# Patient Record
Sex: FEMALE | Race: WHITE | NOT HISPANIC OR LATINO | Employment: UNEMPLOYED | URBAN - METROPOLITAN AREA
[De-identification: names, ages, dates, MRNs, and addresses within clinical notes are randomized per-mention and may not be internally consistent; named-entity substitution may affect disease eponyms.]

---

## 2023-10-04 ENCOUNTER — TELEPHONE (OUTPATIENT)
Dept: PRIMARY CARE | Facility: CLINIC | Age: 58
End: 2023-10-04
Payer: COMMERCIAL

## 2023-10-04 NOTE — TELEPHONE ENCOUNTER
Pt would like a nurse to call her about what the symptoms of a blood clot in a leg are.   [phone]

## 2023-10-12 PROCEDURE — 88175 CYTOPATH C/V AUTO FLUID REDO: CPT

## 2023-10-12 PROCEDURE — 87624 HPV HI-RISK TYP POOLED RSLT: CPT

## 2023-10-14 PROBLEM — M51.36 DDD (DEGENERATIVE DISC DISEASE), LUMBAR: Status: ACTIVE | Noted: 2019-01-03

## 2023-10-14 PROBLEM — R10.9 CHRONIC ABDOMINAL PAIN: Status: ACTIVE | Noted: 2020-01-21

## 2023-10-14 PROBLEM — H69.91 DISORDER OF RIGHT EUSTACHIAN TUBE: Status: ACTIVE | Noted: 2018-08-09

## 2023-10-14 PROBLEM — E78.2 MIXED DYSLIPIDEMIA: Status: ACTIVE | Noted: 2018-11-12

## 2023-10-14 PROBLEM — M19.031 ARTHRITIS OF WRIST, RIGHT: Status: ACTIVE | Noted: 2023-10-14

## 2023-10-14 PROBLEM — R82.90 ABNORMAL FINDING IN URINE: Status: ACTIVE | Noted: 2019-01-03

## 2023-10-14 PROBLEM — E78.2 MIXED HYPERLIPIDEMIA: Status: ACTIVE | Noted: 2018-11-26

## 2023-10-14 PROBLEM — M54.32 SCIATICA, LEFT SIDE: Status: ACTIVE | Noted: 2021-05-20

## 2023-10-14 PROBLEM — J44.89 ASTHMA WITH COPD (MULTI): Status: ACTIVE | Noted: 2019-05-29

## 2023-10-14 PROBLEM — R30.0 DYSURIA: Status: ACTIVE | Noted: 2019-01-24

## 2023-10-14 PROBLEM — T81.49XA SUPERFICIAL POSTOPERATIVE WOUND INFECTION: Status: ACTIVE | Noted: 2023-10-14

## 2023-10-14 PROBLEM — K57.32 DIVERTICULITIS OF LARGE INTESTINE WITHOUT BLEEDING: Status: ACTIVE | Noted: 2023-10-14

## 2023-10-14 PROBLEM — R73.01 IMPAIRED FASTING GLUCOSE: Status: ACTIVE | Noted: 2018-11-12

## 2023-10-14 PROBLEM — M72.2 PLANTAR FASCIITIS OF LEFT FOOT: Status: ACTIVE | Noted: 2022-09-13

## 2023-10-14 PROBLEM — M19.90 INFLAMMATORY ARTHRITIS: Status: ACTIVE | Noted: 2023-10-14

## 2023-10-14 PROBLEM — M19.90 OSTEOARTHRITIS: Status: ACTIVE | Noted: 2019-02-05

## 2023-10-14 PROBLEM — M79.10 MYALGIA: Status: ACTIVE | Noted: 2022-08-09

## 2023-10-14 PROBLEM — L65.9 HAIR LOSS: Status: ACTIVE | Noted: 2018-08-20

## 2023-10-14 PROBLEM — I10 HYPERTENSION: Status: ACTIVE | Noted: 2018-12-05

## 2023-10-14 PROBLEM — L01.00 IMPETIGO: Status: ACTIVE | Noted: 2023-10-14

## 2023-10-14 PROBLEM — G56.01 RIGHT CARPAL TUNNEL SYNDROME: Status: ACTIVE | Noted: 2023-10-14

## 2023-10-14 PROBLEM — M17.11 ARTHRITIS OF KNEE, RIGHT: Status: ACTIVE | Noted: 2023-10-14

## 2023-10-14 PROBLEM — G89.29 CHRONIC ABDOMINAL PAIN: Status: ACTIVE | Noted: 2020-01-21

## 2023-10-14 PROBLEM — E03.9 HYPOTHYROIDISM: Status: ACTIVE | Noted: 2018-08-20

## 2023-10-14 PROBLEM — H00.019 HORDEOLUM EXTERNUM (STYE): Status: ACTIVE | Noted: 2019-01-24

## 2023-10-14 PROBLEM — L91.8 SKIN TAG: Status: ACTIVE | Noted: 2019-03-20

## 2023-10-14 PROBLEM — M77.42 METATARSALGIA OF LEFT FOOT: Status: ACTIVE | Noted: 2018-09-06

## 2023-10-14 PROBLEM — M51.369 DDD (DEGENERATIVE DISC DISEASE), LUMBAR: Status: ACTIVE | Noted: 2019-01-03

## 2023-10-14 PROBLEM — Z86.718 HISTORY OF DEEP VENOUS THROMBOSIS: Status: ACTIVE | Noted: 2023-10-14

## 2023-10-14 PROBLEM — R07.89 CHEST WALL PAIN: Status: ACTIVE | Noted: 2019-08-20

## 2023-10-14 PROBLEM — K57.30 DIVERTICULOSIS OF COLON: Status: ACTIVE | Noted: 2023-10-14

## 2023-10-14 PROBLEM — M76.60 ACHILLES TENDONITIS: Status: ACTIVE | Noted: 2020-12-29

## 2023-10-14 RX ORDER — GLUC/MSM/COLGN2/HYAL/ANTIARTH3 375-375-20
1 TABLET ORAL EVERY 24 HOURS
COMMUNITY

## 2023-10-14 RX ORDER — POLYETHYLENE GLYCOL 3350 17 G/17G
POWDER, FOR SOLUTION ORAL DAILY
COMMUNITY
End: 2024-03-02 | Stop reason: WASHOUT

## 2023-10-14 RX ORDER — ALUMINUM ZIRCONIUM OCTACHLOROHYDREX GLY 16 G/100G
GEL TOPICAL
COMMUNITY
Start: 2018-08-09

## 2023-10-14 RX ORDER — TRAMADOL HYDROCHLORIDE 50 MG/1
50 TABLET ORAL EVERY 8 HOURS PRN
COMMUNITY
Start: 2022-11-14

## 2023-10-14 RX ORDER — MOMETASONE FUROATE AND FORMOTEROL FUMARATE DIHYDRATE 200; 5 UG/1; UG/1
2 AEROSOL RESPIRATORY (INHALATION)
COMMUNITY
Start: 2023-01-18 | End: 2024-03-02 | Stop reason: WASHOUT

## 2023-10-14 RX ORDER — ATORVASTATIN CALCIUM 80 MG/1
80 TABLET, FILM COATED ORAL DAILY
COMMUNITY

## 2023-10-14 RX ORDER — METOPROLOL SUCCINATE 50 MG/1
50 TABLET, EXTENDED RELEASE ORAL DAILY
COMMUNITY

## 2023-10-14 RX ORDER — DOCUSATE SODIUM 100 MG/1
100 CAPSULE ORAL 2 TIMES DAILY PRN
COMMUNITY
Start: 2021-10-11 | End: 2024-03-02 | Stop reason: WASHOUT

## 2023-10-14 RX ORDER — MUPIROCIN 20 MG/G
OINTMENT TOPICAL
COMMUNITY
Start: 2021-05-26 | End: 2024-03-02 | Stop reason: WASHOUT

## 2023-10-14 RX ORDER — MELOXICAM 15 MG/1
15 TABLET ORAL DAILY
COMMUNITY
Start: 2023-05-16 | End: 2024-03-02 | Stop reason: WASHOUT

## 2023-10-14 RX ORDER — LEVOTHYROXINE SODIUM 112 UG/1
112 TABLET ORAL DAILY
COMMUNITY
End: 2023-10-16

## 2023-10-14 RX ORDER — HYDROCODONE BITARTRATE AND ACETAMINOPHEN 5; 325 MG/1; MG/1
TABLET ORAL
COMMUNITY
Start: 2022-02-08 | End: 2024-03-02 | Stop reason: WASHOUT

## 2023-10-14 RX ORDER — CHLORHEXIDINE GLUCONATE ORAL RINSE 1.2 MG/ML
SOLUTION DENTAL
COMMUNITY
Start: 2023-09-12 | End: 2024-03-02 | Stop reason: WASHOUT

## 2023-10-14 RX ORDER — FLUTICASONE FUROATE AND VILANTEROL 200; 25 UG/1; UG/1
POWDER RESPIRATORY (INHALATION)
COMMUNITY
Start: 2021-05-13 | End: 2024-03-02 | Stop reason: WASHOUT

## 2023-10-14 RX ORDER — PANTOPRAZOLE SODIUM 40 MG/1
40 TABLET, DELAYED RELEASE ORAL DAILY
COMMUNITY

## 2023-10-14 RX ORDER — BENZOCAINE AND LEVOMENTHOL 200; 5 MG/G; MG/G
SPRAY TOPICAL
COMMUNITY
End: 2024-03-02 | Stop reason: WASHOUT

## 2023-10-14 RX ORDER — CONJUGATED ESTROGENS 0.62 MG/G
0.5 CREAM VAGINAL 2 TIMES WEEKLY
COMMUNITY
Start: 2023-03-23 | End: 2024-03-02 | Stop reason: WASHOUT

## 2023-10-14 RX ORDER — HYOSCYAMINE SULFATE 0.125 MG
1 TABLET ORAL 4 TIMES DAILY
COMMUNITY
Start: 2021-03-30 | End: 2024-03-02 | Stop reason: WASHOUT

## 2023-10-14 RX ORDER — ALBUTEROL SULFATE 90 UG/1
2 AEROSOL, METERED RESPIRATORY (INHALATION) EVERY 6 HOURS
COMMUNITY
Start: 2022-04-20 | End: 2024-03-11 | Stop reason: SDUPTHER

## 2023-10-14 RX ORDER — SUCRALFATE 1 G/1
1 TABLET ORAL DAILY
COMMUNITY

## 2023-10-14 RX ORDER — VALACYCLOVIR HYDROCHLORIDE 1 G/1
TABLET, FILM COATED ORAL
COMMUNITY
Start: 2021-04-19 | End: 2024-03-02 | Stop reason: WASHOUT

## 2023-10-14 RX ORDER — PREDNISONE 20 MG/1
TABLET ORAL
COMMUNITY
Start: 2021-05-20 | End: 2024-03-02 | Stop reason: WASHOUT

## 2023-10-14 RX ORDER — PSYLLIUM HUSK 0.4 G
2000 CAPSULE ORAL
COMMUNITY
Start: 2018-08-09

## 2023-10-14 RX ORDER — FLUTICASONE PROPIONATE 50 MCG
2 SPRAY, SUSPENSION (ML) NASAL DAILY
COMMUNITY
Start: 2022-11-10 | End: 2024-03-02 | Stop reason: WASHOUT

## 2023-10-14 RX ORDER — ACETAMINOPHEN 500 MG
500 TABLET ORAL EVERY 4 HOURS
COMMUNITY
End: 2024-03-02 | Stop reason: WASHOUT

## 2023-10-14 RX ORDER — DICYCLOMINE HYDROCHLORIDE 20 MG/1
20 TABLET ORAL 3 TIMES DAILY
COMMUNITY
Start: 2023-06-12 | End: 2023-07-12

## 2023-10-14 RX ORDER — LISINOPRIL 2.5 MG/1
2.5 TABLET ORAL DAILY
COMMUNITY
Start: 2018-08-09 | End: 2024-03-02 | Stop reason: WASHOUT

## 2023-10-16 ENCOUNTER — LAB REQUISITION (OUTPATIENT)
Dept: LAB | Facility: HOSPITAL | Age: 58
End: 2023-10-16
Payer: COMMERCIAL

## 2023-10-16 DIAGNOSIS — Z01.419 ENCOUNTER FOR GYNECOLOGICAL EXAMINATION (GENERAL) (ROUTINE) WITHOUT ABNORMAL FINDINGS: ICD-10-CM

## 2023-10-16 DIAGNOSIS — Z12.4 ENCOUNTER FOR SCREENING FOR MALIGNANT NEOPLASM OF CERVIX: ICD-10-CM

## 2023-10-16 DIAGNOSIS — Z11.51 ENCOUNTER FOR SCREENING FOR HUMAN PAPILLOMAVIRUS (HPV): ICD-10-CM

## 2023-10-17 ENCOUNTER — OFFICE VISIT (OUTPATIENT)
Dept: ORTHOPEDIC SURGERY | Facility: CLINIC | Age: 58
End: 2023-10-17
Payer: COMMERCIAL

## 2023-10-17 DIAGNOSIS — M19.031 OSTEOARTHRITIS OF RIGHT WRIST, UNSPECIFIED OSTEOARTHRITIS TYPE: Primary | ICD-10-CM

## 2023-10-17 PROCEDURE — 99213 OFFICE O/P EST LOW 20 MIN: CPT | Performed by: ORTHOPAEDIC SURGERY

## 2023-10-17 PROCEDURE — 3008F BODY MASS INDEX DOCD: CPT | Performed by: ORTHOPAEDIC SURGERY

## 2023-10-17 PROCEDURE — 20606 DRAIN/INJ JOINT/BURSA W/US: CPT | Performed by: PHYSICIAN ASSISTANT

## 2023-10-17 RX ORDER — METHYLPREDNISOLONE ACETATE 40 MG/ML
40 INJECTION, SUSPENSION INTRA-ARTICULAR; INTRALESIONAL; INTRAMUSCULAR; SOFT TISSUE ONCE
Status: SHIPPED | OUTPATIENT
Start: 2023-10-17

## 2023-10-17 ASSESSMENT — ENCOUNTER SYMPTOMS
TROUBLE SWALLOWING: 0
SHORTNESS OF BREATH: 0
WHEEZING: 0
EYE DISCHARGE: 0
SINUS PRESSURE: 0
JOINT SWELLING: 1

## 2023-10-17 ASSESSMENT — PAIN SCALES - GENERAL: PAINLEVEL_OUTOF10: 5 - MODERATE PAIN

## 2023-10-17 ASSESSMENT — PAIN - FUNCTIONAL ASSESSMENT: PAIN_FUNCTIONAL_ASSESSMENT: 0-10

## 2023-10-17 NOTE — PROGRESS NOTES
Reason for Appointment  R wrist pain    History of Present Illness  Patient is a 58 y.o. female here today for follow-up evaluation of right wrist pain.  She was last seen back in August 2022, she has had a previous carpal tunnel release done many years ago, she did have a carpal tunnel injection last year that did not give her much relief.  He did have a right wrist joint injection, she is unsure of how much this helped her.  Mostly pain and aching deep in the wrist joint, she does have some very mild numbness at the tip of the right thumb.  She did have a repeat nerve study test that only showed mild right carpal tunnel syndrome.  She does have a wrist brace at night which makes her pain worse.  No other changes in her past medical history, allergies, or medications.    History reviewed. No pertinent past medical history.    Past Surgical History:   Procedure Laterality Date    US GUIDED THYROID BIOPSY  4/20/2015    US GUIDED THYROID BIOPSY [facility]       Medication Documentation Review Audit       Reviewed by Mica Mitchell MA (Medical Assistant) on 10/17/23 at 1154      Medication Order Taking? Sig Documenting Provider Last Dose Status   acetaminophen (Tylenol) 500 mg tablet [id number] Yes Take 1 tablet (500 mg) by mouth every 4 hours. Historical Provider, MD Taking Active   albuterol (ProAir HFA) 90 mcg/actuation inhaler [id number] Yes Inhale 2 puffs every 6 hours. Historical Provider, MD Taking Active   atorvastatin (Lipitor) 80 mg tablet [id number] Yes Take 1 tablet (80 mg) by mouth once daily. Historical Provider, MD Taking Active   benzocaine-menthoL (Dermoplast, with menthol,) 20-0.5 % topical spray [id number] Yes Dermoplast 20-0.5 % External Aerosol   Refills: 0       Active Historical Provider, MD Taking Active   Bifidobacterium infantis (Align) 4 mg capsule [id number] Yes as directed Orally Historical Provider, MD Taking Active   calcium carbonate-vitamin D3 (Calcium 600 + D,3,) 600 mg-5 mcg  (200 unit) capsule [id number] Yes Take 1 tablet by mouth once every 24 hours. With a meal Michael Ferreira MD Taking Active   chlorhexidine (Peridex) 0.12 % solution [id number] Yes SWISH & SPIT 15 ML 3X A DAY X10 DAYS RINSE 30 SECONDS & SPIT, NO EATTING DRINKING 30 MINS AFTER USE Michael Ferreira MD Taking Active   Colace 100 mg capsule [id number] Yes Take 1 capsule (100 mg) by mouth 2 times a day as needed. Michael Ferreira MD Taking Active   Dulera 200-5 mcg/actuation inhaler [id number] Yes Inhale 2 puffs 2 times a day.  RINSE MOUTH AND THROAT AFTER USE Michael Ferreira MD Taking Active   fluticasone (Flonase) 50 mcg/actuation nasal spray [id number] Yes Administer 2 sprays into affected nostril(s) once daily. Michael Ferreira MD Taking Active   fluticasone furoate-vilanteroL (Breo Ellipta) 200-25 mcg/dose inhaler [id number] Yes Inhale. Michael Ferreira MD Taking Active   HYDROcodone-acetaminophen (Norco) 5-325 mg tablet [id number] Yes Take by mouth. Michael Ferreira MD Taking Active   hyoscyamine (Anaspaz, Levsin) 0.125 mg tablet [id number] Yes Take 1 tablet (0.125 mg) by mouth 4 times a day. Michael Ferreira MD Taking Active     Discontinued 10/16/23 1227   levothyroxine (Synthroid, Levoxyl) 112 mcg tablet [id number] Yes TAKE 1 TABLET BY MOUTH EVERY DAY KENRICK King-CNP Taking Active   lisinopril 2.5 mg tablet [id number] Yes Take 1 tablet (2.5 mg) by mouth once daily. Michael Ferreira MD Taking Active   meloxicam (Mobic) 15 mg tablet [id number] Yes Take 1 tablet (15 mg) by mouth once daily. Michael Ferreira MD Taking Active   metoprolol succinate XL (Toprol-XL) 50 mg 24 hr tablet [id number] Yes Take 1 tablet (50 mg) by mouth once daily. Michael Ferreira MD Taking Active   Miralax 17 gram/dose powder [id number] Yes Take by mouth once daily. Michael Ferreira MD Taking Active   MULTIVITAMIN ORAL [id number] Yes Take 1 tablet by mouth once daily. Michael Ferreira MD  Taking Active   mupirocin (Bactroban) 2 % ointment [id number] Yes Mupirocin 2 % External Ointment   Quantity: 22  Refills: 0        Start : 26-May-2021   Active Historical Provider, MD Taking Active   pantoprazole (ProtoNix) 40 mg EC tablet [id number] Yes Take 1 tablet (40 mg) by mouth once daily. Historical Provider, MD Taking Active   predniSONE (Deltasone) 20 mg tablet [id number] Yes Take by mouth. Historical Provider, MD Taking Active   Premarin vaginal cream [id number] Yes Insert 0.5 g into the vagina 2 times a week. Historical Provider, MD Taking Active   psyllium (MetamuciL) 0.4 gram capsule [id number] Yes Take 2,000 mg by mouth. Historical Provider, MD Taking Active   sucralfate (Carafate) 1 gram tablet [id number] Yes Take 1 tablet (1 g) by mouth once daily. Historical Provider, MD Taking Active   traMADol (Ultram) 50 mg tablet [id number] Yes Take 1 tablet (50 mg) by mouth every 8 hours if needed for moderate pain (4 - 6). not relieved by Ibuprofen or Acetominophen Historical Provider, MD Taking Active   valACYclovir (Valtrex) 1 gram tablet [id number] Yes Take by mouth. Historical Provider, MD Taking Active                    Allergies   Allergen Reactions    Penicillin Unknown       Review of Systems   HENT:  Negative for sinus pressure and trouble swallowing.    Eyes:  Negative for discharge.   Respiratory:  Negative for shortness of breath and wheezing.    Cardiovascular:  Negative for chest pain.   Musculoskeletal:  Positive for joint swelling.   Skin:  Negative for pallor and rash.   All other systems reviewed and are negative.    Exam   On exam she has DJD in the digits.  No significant atrophy in the hand.  Negative Tinel's over the right median nerve.  She has crepitation over the radiocarpal joint and pain with extremes of motion.  No gross instability of the wrist joint.  Tender over the radiocarpal joint.  Good pulses and sensation in the upper extremity.    Assessment   Right wrist osteoarthritis    Plan    At this point, she is having minimal carpal tunnel symptoms.  She has significant crepitation and pain deep in the wrist joint, we will try another wrist injection to see how much relief this gives her.  We sterilely injected under ultrasound guidance Depo-Medrol lidocaine into the right wrist joint.  Patient understands the small risk of infection and the signs to look for as well as flare reaction.  Hopefully this gives her significant relief.  She can follow-up with us in 6 weeks    Written by Sherine Trivedi saw, evaluated, and treated the patient with the PA      Procedures  After discussing the risks and benefits of the procedure we proceeded with an injection. We identified the radiocarpal joint under ultrasound guidance, images obtained. We sterilely injected the right wrist joint with 30 mg of Depo Medrol and .5 cc of 1% lidocaine. Pt tolerated the procedure well without any adverse effects.

## 2023-10-28 ENCOUNTER — ANCILLARY PROCEDURE (OUTPATIENT)
Dept: RADIOLOGY | Facility: CLINIC | Age: 58
End: 2023-10-28
Payer: COMMERCIAL

## 2023-10-28 VITALS — WEIGHT: 240 LBS | HEIGHT: 65 IN | BODY MASS INDEX: 39.99 KG/M2

## 2023-10-28 DIAGNOSIS — Z12.31 ENCOUNTER FOR SCREENING MAMMOGRAM FOR MALIGNANT NEOPLASM OF BREAST: ICD-10-CM

## 2023-10-28 PROCEDURE — 77067 SCR MAMMO BI INCL CAD: CPT

## 2023-10-30 LAB
CYTOLOGY CMNT CVX/VAG CYTO-IMP: NORMAL
HPV HR GENOTYPES PNL CVX NAA+PROBE: NEGATIVE
HPV HR GENOTYPES PNL CVX NAA+PROBE: NEGATIVE
HPV16 DNA SPEC QL NAA+PROBE: NEGATIVE
HPV18 DNA SPEC QL NAA+PROBE: NEGATIVE
LAB AP HPV GENOTYPE QUESTION: YES
LAB AP HPV HR: NORMAL
LABORATORY COMMENT REPORT: NORMAL
MENSTRUAL HX REPORTED: NORMAL
PATH REPORT.TOTAL CANCER: NORMAL

## 2023-12-14 ENCOUNTER — OFFICE VISIT (OUTPATIENT)
Dept: PRIMARY CARE | Facility: CLINIC | Age: 58
End: 2023-12-14
Payer: COMMERCIAL

## 2023-12-14 VITALS
SYSTOLIC BLOOD PRESSURE: 128 MMHG | HEART RATE: 78 BPM | WEIGHT: 244 LBS | OXYGEN SATURATION: 96 % | BODY MASS INDEX: 40.6 KG/M2 | TEMPERATURE: 97 F | DIASTOLIC BLOOD PRESSURE: 80 MMHG

## 2023-12-14 DIAGNOSIS — K08.89 PAIN, DENTAL: Primary | ICD-10-CM

## 2023-12-14 DIAGNOSIS — S03.00XA DISLOCATION OF TEMPOROMANDIBULAR JOINT, INITIAL ENCOUNTER: ICD-10-CM

## 2023-12-14 PROCEDURE — 3074F SYST BP LT 130 MM HG: CPT | Performed by: NURSE PRACTITIONER

## 2023-12-14 PROCEDURE — 99213 OFFICE O/P EST LOW 20 MIN: CPT | Performed by: NURSE PRACTITIONER

## 2023-12-14 PROCEDURE — 3079F DIAST BP 80-89 MM HG: CPT | Performed by: NURSE PRACTITIONER

## 2023-12-14 PROCEDURE — 4004F PT TOBACCO SCREEN RCVD TLK: CPT | Performed by: NURSE PRACTITIONER

## 2023-12-14 RX ORDER — CLINDAMYCIN HYDROCHLORIDE 300 MG/1
300 CAPSULE ORAL 4 TIMES DAILY
Qty: 40 CAPSULE | Refills: 0 | Status: SHIPPED | OUTPATIENT
Start: 2023-12-14 | End: 2023-12-24

## 2023-12-14 ASSESSMENT — ENCOUNTER SYMPTOMS
CHILLS: 0
FEVER: 0
FACIAL SWELLING: 1
TROUBLE SWALLOWING: 0
SHORTNESS OF BREATH: 0
SORE THROAT: 0
SINUS PRESSURE: 0
SINUS PAIN: 0

## 2023-12-14 ASSESSMENT — PAIN SCALES - GENERAL: PAINLEVEL: 5

## 2023-12-14 NOTE — PROGRESS NOTES
Subjective   Patient ID: Izzy Cantor is a 58 y.o. female who presents for Jaw Pain (Left sided in the bone. X1 week.).    HPI   Pt is a 59 yo F who presents with left upper jaw pain that has been ongoing for the past 3-4 months. She had multiple left upper teeth pulled in August. Since then, she has been on multiple antibiotic for infection. Upper jaw is tender and feels swollen. Has difficulty chewing.       Review of Systems   Constitutional:  Negative for chills and fever.   HENT:  Positive for dental problem and facial swelling. Negative for ear pain, mouth sores, sinus pressure, sinus pain, sore throat and trouble swallowing.    Respiratory:  Negative for shortness of breath.    Cardiovascular:  Negative for chest pain.   Skin:  Negative for rash.       Objective   /80   Pulse 78   Temp 36.1 °C (97 °F)   Wt 111 kg (244 lb)   SpO2 96%   BMI 40.60 kg/m²     Physical Exam  A/O x3 NAD  Head NC/AT, no rash  Left TM normal. Auditory canal normal. No pinna or tragal pain.   Oropharynx clear with MMM. Redness to left upper gums without abscess.   Clicking to left TMJ with ROM.   Neck supple. No lymphadenopathy  Breathing unlabored  Neuro grossly intact    Assessment/Plan   Diagnoses and all orders for this visit:  Pain, dental  -     XR panorex; Future  -     clindamycin (Cleocin) 300 mg capsule; Take 1 capsule (300 mg) by mouth 4 times a day for 10 days.  Dislocation of temporomandibular joint, initial encounter    Concern for dental infection  Will cover with antibiotic and have her see her dentist  Pt requests Panorex  Salt water gargles  Heat/ice  Follow up with dentist

## 2023-12-18 ENCOUNTER — TELEPHONE (OUTPATIENT)
Dept: PRIMARY CARE | Facility: CLINIC | Age: 58
End: 2023-12-18
Payer: COMMERCIAL

## 2023-12-18 NOTE — TELEPHONE ENCOUNTER
PT WAS SEEN 12/14 WITH [employer] WHO ORDERED A PANORAMIC VIEW OF JAW . HER INSURANCE WILL NOT COVER THAT . CAN JUST AN ORDER OF JAW BE DONE?   PT IS STILL ON ANTBX. PLEASE LET PT KNOW

## 2023-12-20 ENCOUNTER — TELEPHONE (OUTPATIENT)
Dept: PRIMARY CARE | Facility: CLINIC | Age: 58
End: 2023-12-20
Payer: COMMERCIAL

## 2023-12-20 NOTE — TELEPHONE ENCOUNTER
Patient called and stated she has been battling a severe sinus infection for the past 2 days.  Has been having congestion, sore throat, headache.  No COVID test was done as she knows she has a sinus infection.  OTC medications have not been helping at all. Asking for a medication to be called into the pharmacy.   Has an old prescription of Bactrim and is asking if this would be okay to take.  Patient has no way of coming into the office this week as she does not have a car.    Pharmacy: [facility]    Please advise

## 2023-12-20 NOTE — TELEPHONE ENCOUNTER
Attempted to reach pt at both numbers listed. [phone] states that it is disconnected and [phone] vm is full

## 2024-01-16 ENCOUNTER — OFFICE VISIT (OUTPATIENT)
Dept: ORTHOPEDIC SURGERY | Facility: CLINIC | Age: 59
End: 2024-01-16
Payer: COMMERCIAL

## 2024-01-16 DIAGNOSIS — M17.11 PRIMARY OSTEOARTHRITIS OF RIGHT KNEE: Primary | ICD-10-CM

## 2024-01-16 PROCEDURE — 20610 DRAIN/INJ JOINT/BURSA W/O US: CPT | Performed by: STUDENT IN AN ORGANIZED HEALTH CARE EDUCATION/TRAINING PROGRAM

## 2024-01-16 PROCEDURE — 99204 OFFICE O/P NEW MOD 45 MIN: CPT | Performed by: STUDENT IN AN ORGANIZED HEALTH CARE EDUCATION/TRAINING PROGRAM

## 2024-01-16 PROCEDURE — 2500000004 HC RX 250 GENERAL PHARMACY W/ HCPCS (ALT 636 FOR OP/ED): Performed by: STUDENT IN AN ORGANIZED HEALTH CARE EDUCATION/TRAINING PROGRAM

## 2024-01-16 PROCEDURE — 2500000005 HC RX 250 GENERAL PHARMACY W/O HCPCS: Performed by: STUDENT IN AN ORGANIZED HEALTH CARE EDUCATION/TRAINING PROGRAM

## 2024-01-16 RX ORDER — TRIAMCINOLONE ACETONIDE 40 MG/ML
40 INJECTION, SUSPENSION INTRA-ARTICULAR; INTRAMUSCULAR
Status: COMPLETED | OUTPATIENT
Start: 2024-01-16 | End: 2024-01-16

## 2024-01-16 RX ORDER — LIDOCAINE HYDROCHLORIDE 10 MG/ML
5 INJECTION INFILTRATION; PERINEURAL
Status: COMPLETED | OUTPATIENT
Start: 2024-01-16 | End: 2024-01-16

## 2024-01-16 RX ADMIN — TRIAMCINOLONE ACETONIDE 40 MG: 40 INJECTION, SUSPENSION INTRA-ARTICULAR; INTRAMUSCULAR at 14:00

## 2024-01-16 RX ADMIN — LIDOCAINE HYDROCHLORIDE 5 ML: 10 INJECTION, SOLUTION INFILTRATION; PERINEURAL at 14:00

## 2024-01-16 NOTE — PROGRESS NOTES
Izzy Cantor is a 58 y.o.  year-old  female. she is a new patient to our office and presents with a chief complaint of Right knee pain.  She had injection by another provider roughly 4 months ago.  Has a history of a left normal total knee replacement she has known arthritis and benefits from injections with 3 to 4 months of relief.      Past Medical, Family, and Social History reviewed and inlcude:[none] all other history pertinent to the presenting problem  Review of Systems  A complete review of systems was conducted, pertinent only to the HPI noted above.  Constitutional: None  Eyes: No additions to above history  Ears, Nose, Throat: No additions to above history  Cardiovascular: No additions to above history  Respiratory: No additions to above history  GI: No additions to above history  : No additions to above history  Skin/Neuro: No additions to above history  Endocrine/Heme/Lymph: No additions to above history  Immunologic: No additions to above history  Psychiatric: No additions to above history  Musculoskeletal: see above  GEN: Alert and Oriented x 3  Constitutional: Well appearing [male], in no apparent distress.  Eyes: sclera anicteric  ENT: hearing appropriate for normal conversation, neck appears symmetric with no gross thyromegaly  Pulm: No labored breathing, no wheezing  CVS: Regular rate and rhythm  Skin: No rashes, erythema, or induration around knee    MUSCULOSKELETAL EXAM:     Right KNEE:  ROM:  [0]/ [0] / 120  Effusion: [none]  Alignment:       Sensation intact bilaterally sural/saphenous/sp/dp/tibial nerve bilaterally  Motor 5/5 knee flexion/extension/foot DF/PF/EHL/FHL bilaterally  Palpable/symmetric DP and PT pulse bilaterally      PATELLAR/EXTENSOR MECHANISM:  KNEE:  Patellar Crepitus: yes  Patellar Compression Pain:yes  Patellar Apprehension: [no]  Extensor Mechanism: [intact]  Straight Leg Raise: fair      LIGAMENTS:  ACL: Lachmans: [negative]  PCL: [stable]  Valgus at 0 degrees:  [stable]  Valgus at 30 degrees: [stable]  Varus at 0 degrees: [stable]  Varus at 30 degrees: [stable]    MENISCUS EXAM:  Joint Line Tenderness:medial joint line tenderness  McMurrays: [negative]  Pain with Deep Flexion: [No]    L Inj/Asp: R knee on 1/16/2024 2:00 PM  Indications: pain  Details: 21 G needle, anterolateral approach  Medications: 40 mg triamcinolone acetonide 40 mg/mL; 5 mL lidocaine 10 mg/mL (1 %)  Outcome: tolerated well, no immediate complications  Procedure, treatment alternatives, risks and benefits explained, specific risks discussed. Consent was given by the patient. Immediately prior to procedure a time out was called to verify the correct patient, procedure, equipment, support staff and site/side marked as required. Patient was prepped and draped in the usual sterile fashion.             Xrays independently interpreted and reviewed: Advanced medial compartment arthritis varus alignment    The patient history, physical examination and imaging studies are consistent with knee arthritis. The treatment options including NSAIDs, activity modification, PT (including the importance of obtaining full motion), and weight loss were discussed at length today. I have also suggested a potential for IA injection with cortisone and have provided today at her  request. I have discussed the potential need for arthroplasty in the future. The patient acknowledged the current plan.     Follow up will be 3 months if repeat injection indicated.

## 2024-01-22 ENCOUNTER — TELEPHONE (OUTPATIENT)
Dept: PRIMARY CARE | Facility: CLINIC | Age: 59
End: 2024-01-22
Payer: COMMERCIAL

## 2024-01-22 DIAGNOSIS — E03.9 HYPOTHYROIDISM, UNSPECIFIED TYPE: ICD-10-CM

## 2024-01-22 DIAGNOSIS — E78.2 MIXED HYPERLIPIDEMIA: ICD-10-CM

## 2024-01-22 DIAGNOSIS — Z00.00 ROUTINE MEDICAL EXAM: ICD-10-CM

## 2024-01-22 DIAGNOSIS — R73.01 IMPAIRED FASTING GLUCOSE: ICD-10-CM

## 2024-01-22 DIAGNOSIS — I10 HYPERTENSION, UNSPECIFIED TYPE: ICD-10-CM

## 2024-02-28 ENCOUNTER — OFFICE VISIT (OUTPATIENT)
Dept: PRIMARY CARE | Facility: CLINIC | Age: 59
End: 2024-02-28
Payer: COMMERCIAL

## 2024-02-28 VITALS
WEIGHT: 243.4 LBS | HEART RATE: 72 BPM | OXYGEN SATURATION: 94 % | SYSTOLIC BLOOD PRESSURE: 124 MMHG | BODY MASS INDEX: 40.5 KG/M2 | DIASTOLIC BLOOD PRESSURE: 76 MMHG

## 2024-02-28 DIAGNOSIS — R51.9 LEFT FACIAL PAIN: Primary | ICD-10-CM

## 2024-02-28 PROCEDURE — 3078F DIAST BP <80 MM HG: CPT | Performed by: NURSE PRACTITIONER

## 2024-02-28 PROCEDURE — 3074F SYST BP LT 130 MM HG: CPT | Performed by: NURSE PRACTITIONER

## 2024-02-28 PROCEDURE — 99213 OFFICE O/P EST LOW 20 MIN: CPT | Performed by: NURSE PRACTITIONER

## 2024-02-28 ASSESSMENT — PATIENT HEALTH QUESTIONNAIRE - PHQ9
2. FEELING DOWN, DEPRESSED OR HOPELESS: NOT AT ALL
SUM OF ALL RESPONSES TO PHQ9 QUESTIONS 1 AND 2: 0
SUM OF ALL RESPONSES TO PHQ9 QUESTIONS 1 AND 2: 0
2. FEELING DOWN, DEPRESSED OR HOPELESS: NOT AT ALL
1. LITTLE INTEREST OR PLEASURE IN DOING THINGS: NOT AT ALL
1. LITTLE INTEREST OR PLEASURE IN DOING THINGS: NOT AT ALL

## 2024-02-28 ASSESSMENT — PAIN SCALES - GENERAL: PAINLEVEL: 4

## 2024-02-28 NOTE — PROGRESS NOTES
Subjective   Patient ID: Izzy Cantor is a 58 y.o. female who presents for Left sided cheek pain (Ongoing 3-4 months ).    HPI   Pt is a 59 yo F who complains of continued left facial pain that has been ongoing for past 3-4 months  Began after having dental work to left upper teeth  Pain is over left cheek bone  Saw dentist who said everything was fine  Using advil prn    Review of Systems   Constitutional:  Negative for chills and fever.   HENT:  Positive for facial swelling. Negative for congestion, dental problem, ear pain, mouth sores, nosebleeds, sinus pressure, sinus pain, sneezing and sore throat.         Positive for left cheek bone pain   Respiratory:  Negative for cough.    Cardiovascular:  Negative for chest pain.   Skin:  Negative for rash.   Neurological:  Negative for dizziness, facial asymmetry and headaches.     Objective   /76   Pulse 72   Wt 110 kg (243 lb 6.4 oz)   SpO2 94%   BMI 40.50 kg/m²     Physical Exam  A/O x 3 NAD  Head NC/AT  ENT normal with poor dentition  Tenderness over left zygomatic bone without deformity/stepoff/crepitus  Skin warm and dry without rash  CN 2-12 intact      Assessment/Plan   Diagnoses and all orders for this visit:  Left facial pain  -     CT maxillofacial bones wo IV contrast; Future  Needs further evaluation  Pt with chronic left facial pain  Needs CT for further evaluation  Antiinflammatories  Topicals, heat  Eat soft foods  Follow up after testing

## 2024-03-02 ASSESSMENT — ENCOUNTER SYMPTOMS
CHILLS: 0
SINUS PAIN: 0
FACIAL ASYMMETRY: 0
SORE THROAT: 0
HEADACHES: 0
FACIAL SWELLING: 1
SINUS PRESSURE: 0
DIZZINESS: 0
FEVER: 0
COUGH: 0

## 2024-03-11 ENCOUNTER — OFFICE VISIT (OUTPATIENT)
Dept: PRIMARY CARE | Facility: CLINIC | Age: 59
End: 2024-03-11
Payer: COMMERCIAL

## 2024-03-11 VITALS
TEMPERATURE: 97.6 F | OXYGEN SATURATION: 98 % | BODY MASS INDEX: 41.93 KG/M2 | WEIGHT: 252 LBS | HEART RATE: 74 BPM | DIASTOLIC BLOOD PRESSURE: 80 MMHG | SYSTOLIC BLOOD PRESSURE: 122 MMHG

## 2024-03-11 DIAGNOSIS — J20.8 ACUTE BRONCHITIS DUE TO OTHER SPECIFIED ORGANISMS: ICD-10-CM

## 2024-03-11 DIAGNOSIS — J44.89 ASTHMA WITH COPD (MULTI): Primary | ICD-10-CM

## 2024-03-11 PROCEDURE — 99213 OFFICE O/P EST LOW 20 MIN: CPT | Performed by: NURSE PRACTITIONER

## 2024-03-11 PROCEDURE — 4004F PT TOBACCO SCREEN RCVD TLK: CPT | Performed by: NURSE PRACTITIONER

## 2024-03-11 PROCEDURE — 3079F DIAST BP 80-89 MM HG: CPT | Performed by: NURSE PRACTITIONER

## 2024-03-11 PROCEDURE — 3074F SYST BP LT 130 MM HG: CPT | Performed by: NURSE PRACTITIONER

## 2024-03-11 RX ORDER — ALBUTEROL SULFATE 90 UG/1
2 AEROSOL, METERED RESPIRATORY (INHALATION) EVERY 6 HOURS
Qty: 18 G | Refills: 3 | Status: SHIPPED | OUTPATIENT
Start: 2024-03-11

## 2024-03-11 RX ORDER — METHYLPREDNISOLONE 4 MG/1
TABLET ORAL
Qty: 21 TABLET | Refills: 0 | Status: SHIPPED | OUTPATIENT
Start: 2024-03-11 | End: 2024-03-18

## 2024-03-11 ASSESSMENT — ENCOUNTER SYMPTOMS
DIARRHEA: 0
NAUSEA: 0
CHILLS: 0
COUGH: 1
FEVER: 0
SORE THROAT: 0
SHORTNESS OF BREATH: 0
SINUS PAIN: 1
VOMITING: 0
SINUS PRESSURE: 1
RHINORRHEA: 0

## 2024-03-11 ASSESSMENT — PAIN SCALES - GENERAL: PAINLEVEL: 3

## 2024-03-11 NOTE — PROGRESS NOTES
Subjective   Patient ID: Izzy Cantor is a 58 y.o. female who presents for Cough (Congestion, headache, sinus pressure, and post nasal drip. X3-4 days.).    ORI Magana is a 59 yo F who complains of congestion, cough, HA, sinus pressure for pasta 3-4 days  NO otc used  Oral intake normal   No contacts ill    Review of Systems   Constitutional:  Negative for chills and fever.   HENT:  Positive for congestion, sinus pressure and sinus pain. Negative for ear pain, rhinorrhea, sneezing and sore throat.    Respiratory:  Positive for cough. Negative for shortness of breath.    Cardiovascular:  Negative for chest pain.   Gastrointestinal:  Negative for diarrhea, nausea and vomiting.       Objective   /80   Pulse 74   Temp 36.4 °C (97.6 °F)   Wt 114 kg (252 lb)   SpO2 98%   BMI 41.93 kg/m²     Physical Exam  Gen: A/O x3 NAD  Eyes: WNL  ENT: Bilat TM dull. Auditory canals wnl. Bilat nares red and patent. + clear rhinorrhea. + Posterior pharynx erythema. No exudate. Uvula midline. No sinus tenderness.  Lungs: Breath sounds  coarse bilaterally with moist cough. Faint exp wheeze bilaterally. Speaks complete sentences/Unlabored.  Heart: RRR, no murmur  Neck: supple, no adenopathy  Skin: warm/dry, no rash  Neuro: grossly intact     Assessment/Plan   Diagnoses and all orders for this visit:  Asthma with COPD  -     albuterol (ProAir HFA) 90 mcg/actuation inhaler; Inhale 2 puffs every 6 hours.  -     methylPREDNISolone (Medrol Dospak) 4 mg tablets; Take as directed on package.  Acute bronchitis due to other specified organisms  -     albuterol (ProAir HFA) 90 mcg/actuation inhaler; Inhale 2 puffs every 6 hours.  -     methylPREDNISolone (Medrol Dospak) 4 mg tablets; Take as directed on package.  Viral vs bacterial discussed  Will treat with albuterol and steroid rx  Fluids rest humidifier  Follow up 101-4 days INB

## 2024-03-16 ENCOUNTER — HOSPITAL ENCOUNTER (OUTPATIENT)
Dept: RADIOLOGY | Facility: HOSPITAL | Age: 59
Discharge: HOME | End: 2024-03-16
Payer: COMMERCIAL

## 2024-03-16 ENCOUNTER — LAB (OUTPATIENT)
Dept: LAB | Facility: LAB | Age: 59
End: 2024-03-16
Payer: COMMERCIAL

## 2024-03-16 DIAGNOSIS — R51.9 LEFT FACIAL PAIN: ICD-10-CM

## 2024-03-16 DIAGNOSIS — Z00.00 ROUTINE MEDICAL EXAM: ICD-10-CM

## 2024-03-16 DIAGNOSIS — R73.01 IMPAIRED FASTING GLUCOSE: ICD-10-CM

## 2024-03-16 DIAGNOSIS — E03.9 HYPOTHYROIDISM, UNSPECIFIED TYPE: ICD-10-CM

## 2024-03-16 DIAGNOSIS — I10 HYPERTENSION, UNSPECIFIED TYPE: ICD-10-CM

## 2024-03-16 DIAGNOSIS — E78.2 MIXED HYPERLIPIDEMIA: ICD-10-CM

## 2024-03-16 LAB
ALBUMIN SERPL BCP-MCNC: 3.9 G/DL (ref 3.4–5)
ALP SERPL-CCNC: 71 U/L (ref 33–136)
ALT SERPL W P-5'-P-CCNC: 25 U/L (ref 7–45)
ANION GAP SERPL CALC-SCNC: 11 MMOL/L (ref 10–20)
APPEARANCE UR: ABNORMAL
AST SERPL W P-5'-P-CCNC: 19 U/L (ref 9–39)
BASOPHILS # BLD AUTO: 0.09 X10*3/UL (ref 0–0.1)
BASOPHILS NFR BLD AUTO: 1.2 %
BILIRUB SERPL-MCNC: 0.7 MG/DL (ref 0–1.2)
BILIRUB UR STRIP.AUTO-MCNC: NEGATIVE MG/DL
BUN SERPL-MCNC: 22 MG/DL (ref 6–23)
CALCIUM SERPL-MCNC: 8.7 MG/DL (ref 8.6–10.3)
CHLORIDE SERPL-SCNC: 103 MMOL/L (ref 98–107)
CHOLEST SERPL-MCNC: 141 MG/DL (ref 0–199)
CHOLESTEROL/HDL RATIO: 3.3
CO2 SERPL-SCNC: 31 MMOL/L (ref 21–32)
COLOR UR: YELLOW
CREAT SERPL-MCNC: 0.82 MG/DL (ref 0.5–1.05)
CREAT UR-MCNC: 110.1 MG/DL (ref 20–320)
EGFRCR SERPLBLD CKD-EPI 2021: 82 ML/MIN/1.73M*2
EOSINOPHIL # BLD AUTO: 0.3 X10*3/UL (ref 0–0.7)
EOSINOPHIL NFR BLD AUTO: 4.1 %
ERYTHROCYTE [DISTWIDTH] IN BLOOD BY AUTOMATED COUNT: 12.3 % (ref 11.5–14.5)
EST. AVERAGE GLUCOSE BLD GHB EST-MCNC: 117 MG/DL
GLUCOSE SERPL-MCNC: 82 MG/DL (ref 74–99)
GLUCOSE UR STRIP.AUTO-MCNC: NEGATIVE MG/DL
HBA1C MFR BLD: 5.7 %
HCT VFR BLD AUTO: 45.6 % (ref 36–46)
HDLC SERPL-MCNC: 42.3 MG/DL
HGB BLD-MCNC: 15.1 G/DL (ref 12–16)
HYALINE CASTS #/AREA URNS AUTO: ABNORMAL /LPF
IMM GRANULOCYTES # BLD AUTO: 0.06 X10*3/UL (ref 0–0.7)
IMM GRANULOCYTES NFR BLD AUTO: 0.8 % (ref 0–0.9)
KETONES UR STRIP.AUTO-MCNC: NEGATIVE MG/DL
LDLC SERPL CALC-MCNC: 64 MG/DL
LEUKOCYTE ESTERASE UR QL STRIP.AUTO: ABNORMAL
LYMPHOCYTES # BLD AUTO: 2.47 X10*3/UL (ref 1.2–4.8)
LYMPHOCYTES NFR BLD AUTO: 33.8 %
MCH RBC QN AUTO: 31.7 PG (ref 26–34)
MCHC RBC AUTO-ENTMCNC: 33.1 G/DL (ref 32–36)
MCV RBC AUTO: 96 FL (ref 80–100)
MICROALBUMIN UR-MCNC: <7 MG/L
MICROALBUMIN/CREAT UR: NORMAL MG/G{CREAT}
MONOCYTES # BLD AUTO: 0.96 X10*3/UL (ref 0.1–1)
MONOCYTES NFR BLD AUTO: 13.1 %
MUCOUS THREADS #/AREA URNS AUTO: ABNORMAL /LPF
NEUTROPHILS # BLD AUTO: 3.43 X10*3/UL (ref 1.2–7.7)
NEUTROPHILS NFR BLD AUTO: 47 %
NITRITE UR QL STRIP.AUTO: NEGATIVE
NON HDL CHOLESTEROL: 99 MG/DL (ref 0–149)
NRBC BLD-RTO: 0 /100 WBCS (ref 0–0)
PH UR STRIP.AUTO: 5 [PH]
PLATELET # BLD AUTO: 236 X10*3/UL (ref 150–450)
POTASSIUM SERPL-SCNC: 4.4 MMOL/L (ref 3.5–5.3)
PROT SERPL-MCNC: 6.3 G/DL (ref 6.4–8.2)
PROT UR STRIP.AUTO-MCNC: NEGATIVE MG/DL
RBC # BLD AUTO: 4.77 X10*6/UL (ref 4–5.2)
RBC # UR STRIP.AUTO: NEGATIVE /UL
RBC #/AREA URNS AUTO: ABNORMAL /HPF
SODIUM SERPL-SCNC: 141 MMOL/L (ref 136–145)
SP GR UR STRIP.AUTO: 1.02
SQUAMOUS #/AREA URNS AUTO: ABNORMAL /HPF
TRIGL SERPL-MCNC: 174 MG/DL (ref 0–149)
TSH SERPL-ACNC: 0.64 MIU/L (ref 0.44–3.98)
UROBILINOGEN UR STRIP.AUTO-MCNC: <2 MG/DL
VLDL: 35 MG/DL (ref 0–40)
WBC # BLD AUTO: 7.3 X10*3/UL (ref 4.4–11.3)
WBC #/AREA URNS AUTO: ABNORMAL /HPF

## 2024-03-16 PROCEDURE — 84443 ASSAY THYROID STIM HORMONE: CPT

## 2024-03-16 PROCEDURE — 81001 URINALYSIS AUTO W/SCOPE: CPT

## 2024-03-16 PROCEDURE — 76377 3D RENDER W/INTRP POSTPROCES: CPT

## 2024-03-16 PROCEDURE — 82043 UR ALBUMIN QUANTITATIVE: CPT

## 2024-03-16 PROCEDURE — 80053 COMPREHEN METABOLIC PANEL: CPT

## 2024-03-16 PROCEDURE — 82570 ASSAY OF URINE CREATININE: CPT

## 2024-03-16 PROCEDURE — 85025 COMPLETE CBC W/AUTO DIFF WBC: CPT

## 2024-03-16 PROCEDURE — 83036 HEMOGLOBIN GLYCOSYLATED A1C: CPT

## 2024-03-16 PROCEDURE — 36415 COLL VENOUS BLD VENIPUNCTURE: CPT

## 2024-03-16 PROCEDURE — 80061 LIPID PANEL: CPT

## 2024-03-16 PROCEDURE — 70486 CT MAXILLOFACIAL W/O DYE: CPT

## 2024-04-02 ENCOUNTER — OFFICE VISIT (OUTPATIENT)
Dept: PRIMARY CARE | Facility: CLINIC | Age: 59
End: 2024-04-02
Payer: COMMERCIAL

## 2024-04-02 VITALS
DIASTOLIC BLOOD PRESSURE: 84 MMHG | HEIGHT: 64 IN | BODY MASS INDEX: 42.34 KG/M2 | WEIGHT: 248 LBS | SYSTOLIC BLOOD PRESSURE: 128 MMHG | OXYGEN SATURATION: 96 % | HEART RATE: 78 BPM

## 2024-04-02 DIAGNOSIS — Z00.00 WELL ADULT EXAM: Primary | ICD-10-CM

## 2024-04-02 DIAGNOSIS — Z72.0 TOBACCO USE: ICD-10-CM

## 2024-04-02 DIAGNOSIS — J44.89 ASTHMA WITH COPD (MULTI): ICD-10-CM

## 2024-04-02 DIAGNOSIS — E03.8 OTHER SPECIFIED HYPOTHYROIDISM: ICD-10-CM

## 2024-04-02 PROCEDURE — 3074F SYST BP LT 130 MM HG: CPT | Performed by: NURSE PRACTITIONER

## 2024-04-02 PROCEDURE — 99396 PREV VISIT EST AGE 40-64: CPT | Performed by: NURSE PRACTITIONER

## 2024-04-02 PROCEDURE — 3079F DIAST BP 80-89 MM HG: CPT | Performed by: NURSE PRACTITIONER

## 2024-04-02 PROCEDURE — 99212 OFFICE O/P EST SF 10 MIN: CPT | Performed by: NURSE PRACTITIONER

## 2024-04-02 RX ORDER — BUDESONIDE AND FORMOTEROL FUMARATE DIHYDRATE 160; 4.5 UG/1; UG/1
2 AEROSOL RESPIRATORY (INHALATION)
Qty: 10.2 G | Refills: 5 | Status: SHIPPED | OUTPATIENT
Start: 2024-04-02 | End: 2025-04-02

## 2024-04-02 RX ORDER — LEVOTHYROXINE SODIUM 112 UG/1
112 TABLET ORAL DAILY
Qty: 90 TABLET | Refills: 1 | Status: SHIPPED | OUTPATIENT
Start: 2024-04-02

## 2024-04-02 ASSESSMENT — PATIENT HEALTH QUESTIONNAIRE - PHQ9
2. FEELING DOWN, DEPRESSED OR HOPELESS: NOT AT ALL
1. LITTLE INTEREST OR PLEASURE IN DOING THINGS: NOT AT ALL
SUM OF ALL RESPONSES TO PHQ9 QUESTIONS 1 AND 2: 0

## 2024-04-02 ASSESSMENT — PAIN SCALES - GENERAL: PAINLEVEL: 0-NO PAIN

## 2024-04-02 NOTE — PROGRESS NOTES
Subjective   Patient ID: Izzy Cantor is a 58 y.o. female who presents for CPE (Colon: 6-2-20 EKG: P[facility] GYN: [facility] Mammo: 10-13-23 DEXA: 2017 PAP: ).    ORI PAGAN is A 58-year-old female who is seen for for her comprehensive physical exam.   history of hypertension, hypothyroid, status post thyroidectomy, hyperlipidemia, GERD, Eustachian tube dysfunction,  tobacco use     Diet is varied  Does not exercise  Smoker - 1 pack per day times 40+ years.  She is not ready to quit.    GYN history -.  Sees a Gynecologist.  [facility]  +HPV lesion removed from external vulva   Pap  10/23   Mammogram 10/23  DEXA November 2017  has atrophic vaginitis     Colonoscopy  6/20.  repeat due 2025  hx of diverticulitis       EKG with Dr. Gerardo who she sees twice a year     Immunizations reviewed     IZZY is seen today for follow-up of Hypercholesterolemia.  seeing Dr Coats every 6 months IZZY is tolerating atorvastatin 80mg and complains of no side effects. She is sometimes compliant with is her diet.       IZZY is seen for follow-up of Hypothyroidism.   She states the thyroid condition is usually well controlled. She denies palpitations, weight gain and weight loss . history of thyroidectomy      IZZY is here for follow-up of hypertension.  seeing Dr Coats every 6 months Patient denies chest pain, shortness of breath and dizziness . She does not have claudication as complications related to her hypertension.      Hx of asthma/COPD, not compliant with inhalers. Continues to smoke.  PFT's 2019    Bw reviewed      Review of Systems  Constitutional Symptoms: negative for fever, loss of appetite, headaches, fatigue.   Eyes: negative for loss and blurring of vision, double vision.   Ear, Nose, Mouth, Throat: negative for hearing loss, tinnitus, nasal congestion, rhinorrhea, nose bleeds, teeth problems, mouth sores, gum disease, dysphagia, sore throat.   Cardiovascular: negative for chest  "pain/pressure, palpitations, edema, claudication.   Respiratory: negative for shortness of breath, dyspnea on exertion, pain with breathing, coughing.   Breast: negative for tenderness, masses, gynecomastia.   Gastrointestinal: negative for anorexia, indigestion, nausea, vomiting, abdominal pain, change in bowel habits, diarrhea, constipation, hematochezia, melena, blood in stool.    : Negative for urinary or genital complaints  Musculoskeletal: negative for joint pain, joint swelling, myalgia, cramps.   Integumentary: negative for change in mole, skin trouble or rash.   Neurological: negative for headache, numbness, tingling, weakness, tremors.   Psychiatric: negative for depression, anxiety.   Endocrine: negative for weight gain, heat or cold intolerance, polyuria, polydipsia, polyphagia.   Hematologic/Lymphatic: negative for bruising, abnormal bleeding, swollen glands     Objective   /84   Pulse 78   Ht 1.626 m (5' 4\")   Wt 112 kg (248 lb)   LMP 05/01/2016 (Approximate)   SpO2 96%   BMI 42.57 kg/m²     Physical Exam  General Appearance: Comfortable. She is well nourished, and well developed. She is awake, alert, and oriented and appears her stated age. The patient is cooperative with exam.  Head: Hair pattern reveals a normal pattern for patient's age and The face shows no abnormalities.  Eyes: PERRLA, EOMI, conjunctiva and sclera clear. Extraocular muscle exam reveals EOMI.  Ears, Nose, Mouth, Throat: Bilateral canals are normal. Both tympanic membranes are pearly gray and landmarks normal.    Nasal mucosa in both nostrils reveals no polyps, ulcerations, or lesions. Oral mucosa reveals no abnormalities and Teeth reveal good repair.  Neck: Neck reveals supple, no adenopathy, no thyromegaly, or carotid bruits.  Chest: Lungs coarse to auscultation bilaterally with exp wheezes, No rales, or rhonchi.  Cardiovascular: RRR without MRG. No edema  Breast: gyn  Abdomen: Abdomen is soft, NT, ND with no " masses.  Genitourinary: GYN  Lymph Nodes: Bilateral axillary lymph nodes are unremarkable. Bilateral inguinal lymph nodes are unremarkable.  Musculoskeletal: 5/5 and equal strength in bilateral upper and lower extremities.  Skin: Skin reveals good turgor and no rashes.  Neurological: Intact and non-focal. Cranial nerves II - XII are grossly intact.  Psychiatric: Patient has appropriate judgement. Patient has good insight. Patient's mood is appropriate.     Assessment/Plan   Diagnoses and all orders for this visit:  Well adult exam  57 yo f Well adult exam  Continue medications and specialist care  Preventative screenings reviewed and updated  Immunizations reviewed  Mediterranean diet, exercise, weight loss  Smoking cessation encouraged  Follow up 6 months  Other specified hypothyroidism  -     levothyroxine (Synthroid, Levoxyl) 112 mcg tablet; Take 1 tablet (112 mcg) by mouth once daily.  stable  Asthma with COPD (CMS/[facility])  -     budesonide-formoteroL (Symbicort) 160-4.5 mcg/actuation inhaler; Inhale 2 puffs 2 times a day. Rinse mouth with water after use to reduce aftertaste and incidence of candidiasis. Do not swallow.  Needs better control  Symbicort rx given  Stop smoking  Tobacco use  -     CT lung screening low dose; Future  Smoking cessation encouraged

## 2024-04-09 ENCOUNTER — TELEPHONE (OUTPATIENT)
Dept: PRIMARY CARE | Facility: CLINIC | Age: 59
End: 2024-04-09
Payer: COMMERCIAL

## 2024-04-09 NOTE — TELEPHONE ENCOUNTER
Patient called [phone] she had a CPE 4/2 she is having cataract eye surgery 4-18. [employer] was to fax over a paper to [facility] phone# [phone], fax# [phone], has it been  done ? told her [employer] is out today

## 2024-04-20 ENCOUNTER — APPOINTMENT (OUTPATIENT)
Dept: RADIOLOGY | Facility: HOSPITAL | Age: 59
End: 2024-04-20
Payer: COMMERCIAL

## 2024-04-23 ENCOUNTER — HOSPITAL ENCOUNTER (OUTPATIENT)
Dept: RADIOLOGY | Facility: HOSPITAL | Age: 59
Discharge: HOME | End: 2024-04-23
Payer: COMMERCIAL

## 2024-04-23 DIAGNOSIS — Z72.0 TOBACCO USE: ICD-10-CM

## 2024-04-23 PROCEDURE — 71271 CT THORAX LUNG CANCER SCR C-: CPT

## 2024-05-16 ENCOUNTER — APPOINTMENT (OUTPATIENT)
Dept: ORTHOPEDIC SURGERY | Facility: CLINIC | Age: 59
End: 2024-05-16
Payer: COMMERCIAL

## 2024-05-30 ENCOUNTER — HOSPITAL ENCOUNTER (OUTPATIENT)
Dept: RADIOLOGY | Facility: CLINIC | Age: 59
Discharge: HOME | End: 2024-05-30
Payer: COMMERCIAL

## 2024-05-30 ENCOUNTER — OFFICE VISIT (OUTPATIENT)
Dept: ORTHOPEDIC SURGERY | Facility: CLINIC | Age: 59
End: 2024-05-30
Payer: COMMERCIAL

## 2024-05-30 DIAGNOSIS — M79.643 PAIN OF HAND, UNSPECIFIED LATERALITY: Primary | ICD-10-CM

## 2024-05-30 DIAGNOSIS — M17.11 PRIMARY OSTEOARTHRITIS OF RIGHT KNEE: ICD-10-CM

## 2024-05-30 PROCEDURE — 73564 X-RAY EXAM KNEE 4 OR MORE: CPT | Mod: RT

## 2024-05-30 PROCEDURE — 20610 DRAIN/INJ JOINT/BURSA W/O US: CPT | Performed by: ORTHOPAEDIC SURGERY

## 2024-05-30 PROCEDURE — 4004F PT TOBACCO SCREEN RCVD TLK: CPT | Performed by: ORTHOPAEDIC SURGERY

## 2024-05-30 PROCEDURE — 99214 OFFICE O/P EST MOD 30 MIN: CPT | Performed by: ORTHOPAEDIC SURGERY

## 2024-05-30 RX ORDER — TRIAMCINOLONE ACETONIDE 40 MG/ML
1 INJECTION, SUSPENSION INTRA-ARTICULAR; INTRAMUSCULAR
Status: COMPLETED | OUTPATIENT
Start: 2024-05-30 | End: 2024-05-30

## 2024-05-30 RX ADMIN — TRIAMCINOLONE ACETONIDE 1 ML: 40 INJECTION, SUSPENSION INTRA-ARTICULAR; INTRAMUSCULAR at 09:26

## 2024-05-30 NOTE — PROGRESS NOTES
This is a consultation from Dr. Lindsey Montes De Oca, APRN-CNP for   Chief Complaint   Patient presents with   • Right Knee - Pain       This is a 58 y.o. female who presents for evaluation of right knee pain.  Patient states she has had right knee pain for years, this is chronic issues and recently exacerbated.  She clinically sharp pain over the medial knee worse with walking or standing.  She is never had surgery on the knee.  She has had multiple injections in the past which gave her 2 to 3 months of relief.  She takes over-the-counter anti-inflammatories.  No numbness no tingling no fevers no chills no shooting pain down the leg.  Gait not using any assistive devices    Physical Exam    There has been no interval change in this patient's past medical, surgical, medications, allergies, family history or social history since the most recent visit to a provider within our department. 14 point review of systems was performed, reviewed, and negative except for pertinent positives documented in the history of present illness.     Constitutional: well developed, well nourished female in no acute distress  Psychiatric: normal mood, appropriate affect  Eyes: sclera anicteric  HENT: normocephalic/atraumatic  CV: regular rate and rhythm   Respiratory: non labored breathing  Integumentary: no rash  Neurological: moves all extremities    Right knee exam: skin intact no lacerations or abrations.  1+ effusion.  Tender medial joint line. negative log roll negative patellar grind. ROM 0-120. stable to varus and valgus stress at 0 and 30 degrees. negative lachman negative posterior drawer negative geoff. 5/5 ehl/fhl/gs/ta. silt s/s/sp/dp/t. 2+ dp/pt        Xrays were ordered by me, they were reviewed and independently interpreted by me today, they show severe degenerative disease bone-on-bone arthritis subchondral sclerosis osteophyte formation    L Inj/Asp: R knee on 5/30/2024 9:26 AM  Indications: pain and joint  swelling  Details: 22 G needle, anterolateral approach  Medications: 1 mL triamcinolone acetonide 40 mg/mL    Discussion:  I discussed the conservative treatment options for knee osteoarthritis including but not limited to physical therapy, oral NSAIDS, activity and lifestyle modification, and corticosteroid injections. Pt has elected to undergo a cortisone injection today. I have explained the risk and benefits of an injection including the possibility of joint infection, bleeding, damage to cartilage, allergic reaction. Patient verbalized understanding and gave verbal consent wishes to proceed with a intra-articular cortisone injection for their knee.    Procedure:  After discussing the risk and benefits of the procedure, we proceeded with an intra-articular right knee injection. We discussed the risks and benefits and potential morbidity related to the treatment, and to the prescription medication administered in the injection    With the patient's informed verbal consent, the right knee was prepped in standard sterile fashion with Chlorhexidine. The skin was then anesthetized with ethyl chloride spray and cleaned again with Chlorhexidine. The knee was then apirated/injected with a prefilled 20-gauge syringe of 40 mg Kenalog + 4 ml Lidocaine using the lateral approach without complications.  The patient tolerated this well and felt immediate initial relief of symptoms. A bandaid was applied and the patient ambulated out of the clinic on ther own accord without difficulty. Patient was instructed to avoid physical activity for 24-48 hours to prevent the knees from swelling and may ice the knees as tolerated. Patient should contact the office if any signs of of infection appear: redness, fever, chills, drainage, swelling or warmth to the knees.  Pt understands that the injections can be repeated no sooner than 3 months.    Procedure, treatment alternatives, risks and benefits explained, specific risks discussed.  "Consent was given by the patient. Immediately prior to procedure a time out was called to verify the correct patient, procedure, equipment, support staff and site/side marked as required. Patient was prepped and draped in the usual sterile fashion.         Impression/Plan: This is a 58 y.o. female with severe right knee arthritis.  I had an in depth discussion with the patient regarding treatment options for arthritis and their relative risks and benefits. We reviewed surgical and nonsurgical option for treatment. Treatments include anti inflammatory medications, physical therapy, weight loss, activity modification, use of assistive devices, injection therapies. We discussed current prescriptions and risks and benefits of continuation of prescription medication as apporpriate. We discussed that arthritis is often progressive over time, an in end stage arthritis surgical interventions can be considered, including arthroplasty. All questions were answered and the patient voiced their understanding.  Will see her back.    BMI Readings from Last 1 Encounters:   04/02/24 42.57 kg/m²      Lab Results   Component Value Date    CREATININE 0.82 03/16/2024     Tobacco Use: High Risk (5/30/2024)    Patient History    • Smoking Tobacco Use: Every Day    • Smokeless Tobacco Use: Never    • Passive Exposure: Not on file      Computed MELD 3.0 unavailable. One or more values for this score either were not found within the given timeframe or did not fit some other criterion.  Computed MELD-Na unavailable. One or more values for this score either were not found within the given timeframe or did not fit some other criterion.       Lab Results   Component Value Date    HGBA1C 5.7 (H) 03/16/2024     No results found for: \"STAPHMRSASCR\"  "

## 2024-05-30 NOTE — LETTER
May 30, 2024     KENRICK King-MAI  [address] [place] [address]  [address]  [place] [address]    Patient: Izzy Cantor   YOB: 1965   Date of Visit: 5/30/2024       Dear KENRICK HallCNP:    Thank you for referring Izzy Cantor to me for evaluation. Below are my notes for this consultation.  If you have questions, please do not hesitate to call me. I look forward to following your patient along with you.       Sincerely,     Chirag Mercedes MD      CC: No Recipients  ______________________________________________________________________________________    This is a consultation from KENRICK HallCNP for   Chief Complaint   Patient presents with   • Right Knee - Pain       This is a 58 y.o. female who presents for evaluation of right knee pain.  Patient states she has had right knee pain for years, this is chronic issues and recently exacerbated.  She clinically sharp pain over the medial knee worse with walking or standing.  She is never had surgery on the knee.  She has had multiple injections in the past which gave her 2 to 3 months of relief.  She takes over-the-counter anti-inflammatories.  No numbness no tingling no fevers no chills no shooting pain down the leg.  Gait not using any assistive devices    Physical Exam    There has been no interval change in this patient's past medical, surgical, medications, allergies, family history or social history since the most recent visit to a provider within our department. 14 point review of systems was performed, reviewed, and negative except for pertinent positives documented in the history of present illness.     Constitutional: well developed, well nourished female in no acute distress  Psychiatric: normal mood, appropriate affect  Eyes: sclera anicteric  HENT: normocephalic/atraumatic  CV: regular rate and rhythm   Respiratory: non labored breathing  Integumentary: no rash  Neurological: moves all extremities    Right  knee exam: skin intact no lacerations or abrations.  1+ effusion.  Tender medial joint line. negative log roll negative patellar grind. ROM 0-120. stable to varus and valgus stress at 0 and 30 degrees. negative lachman negative posterior drawer negative geoff. 5/5 ehl/fhl/gs/ta. silt s/s/sp/dp/t. 2+ dp/pt        Xrays were ordered by me, they were reviewed and independently interpreted by me today, they show severe degenerative disease bone-on-bone arthritis subchondral sclerosis osteophyte formation    L Inj/Asp: R knee on 5/30/2024 9:26 AM  Indications: pain and joint swelling  Details: 22 G needle, anterolateral approach  Medications: 1 mL triamcinolone acetonide 40 mg/mL    Discussion:  I discussed the conservative treatment options for knee osteoarthritis including but not limited to physical therapy, oral NSAIDS, activity and lifestyle modification, and corticosteroid injections. Pt has elected to undergo a cortisone injection today. I have explained the risk and benefits of an injection including the possibility of joint infection, bleeding, damage to cartilage, allergic reaction. Patient verbalized understanding and gave verbal consent wishes to proceed with a intra-articular cortisone injection for their knee.    Procedure:  After discussing the risk and benefits of the procedure, we proceeded with an intra-articular right knee injection. We discussed the risks and benefits and potential morbidity related to the treatment, and to the prescription medication administered in the injection    With the patient's informed verbal consent, the right knee was prepped in standard sterile fashion with Chlorhexidine. The skin was then anesthetized with ethyl chloride spray and cleaned again with Chlorhexidine. The knee was then apirated/injected with a prefilled 20-gauge syringe of 40 mg Kenalog + 4 ml Lidocaine using the lateral approach without complications.  The patient tolerated this well and felt immediate  initial relief of symptoms. A bandaid was applied and the patient ambulated out of the clinic on ther own accord without difficulty. Patient was instructed to avoid physical activity for 24-48 hours to prevent the knees from swelling and may ice the knees as tolerated. Patient should contact the office if any signs of of infection appear: redness, fever, chills, drainage, swelling or warmth to the knees.  Pt understands that the injections can be repeated no sooner than 3 months.    Procedure, treatment alternatives, risks and benefits explained, specific risks discussed. Consent was given by the patient. Immediately prior to procedure a time out was called to verify the correct patient, procedure, equipment, support staff and site/side marked as required. Patient was prepped and draped in the usual sterile fashion.         Impression/Plan: This is a 58 y.o. female with severe right knee arthritis.  I had an in depth discussion with the patient regarding treatment options for arthritis and their relative risks and benefits. We reviewed surgical and nonsurgical option for treatment. Treatments include anti inflammatory medications, physical therapy, weight loss, activity modification, use of assistive devices, injection therapies. We discussed current prescriptions and risks and benefits of continuation of prescription medication as apporpriate. We discussed that arthritis is often progressive over time, an in end stage arthritis surgical interventions can be considered, including arthroplasty. All questions were answered and the patient voiced their understanding.  Will see her back.    BMI Readings from Last 1 Encounters:   04/02/24 42.57 kg/m²      Lab Results   Component Value Date    CREATININE 0.82 03/16/2024     Tobacco Use: High Risk (5/30/2024)    Patient History    • Smoking Tobacco Use: Every Day    • Smokeless Tobacco Use: Never    • Passive Exposure: Not on file      Computed MELD 3.0 unavailable. One or  "more values for this score either were not found within the given timeframe or did not fit some other criterion.  Computed MELD-Na unavailable. One or more values for this score either were not found within the given timeframe or did not fit some other criterion.       Lab Results   Component Value Date    HGBA1C 5.7 (H) 03/16/2024     No results found for: \"STAPHMRSASCR\"  "

## 2024-06-26 NOTE — PROGRESS NOTES
Patient ID: Izzy Cantor is a 58 y.o. female.  Primary Care Provider: KENRICK King-CNP    Subjective    Ref Provider: MELANIE Ball DO  PCP: MARCO ANTONIO Montes De Oca CNP    HPI: Presenting in Gyn/Oncology consultation regarding persistent vulvar dysplasia. Patient was undergoing routine surveillance for recurrent L vulvar high grade dysplasia/DEVENDRA-3 with her primary GYN, Dr. Ball, with evolution of a L vulvar lesion that underwent bx again re-demonstrating recurrence on the L labia.     Medical History:  - BMI 40  - COPD (not requiring supplemental oxygen)  - Hypothyroidism  - Diverticular disease  - HLD  - HTN  - CAD    Surgical History:  - Appendectomy  - Cholecystectomy  - L total knee replacement  - Laser conization of the cervix; two prior WLE of L vulva ()  - Partial colectomy for hyperplastic polyp  - Bilateral tubal ligation  - Thyroidectomy    Obstetric/Gynecologic History:  - FTSVD x3  - Menarche age 11  - Menopausal since , denies PMB  - History of OCP or HRT use: Intermittent OCP use, denies HRT  - Last Pap: 2020 - NILM, HPV-    Family History:   - Denies family history of breast/colon/ovarian/uterine cancers    Social History: Patient is unemployed, . Significant other name is Henry.   - Tobacco: 1 PPD smoker x 43 pack years. Has not previously tried to quit and expressed that she is not interested in quitting or reducing intake.   - Alcohol: Denies  - Illicit substances, narcotics: Denies    Health Maintenance:   - Last mammogram: , BI-RADS category 2  - Last colonoscopy/CRC screenin - hyperplastic polyp     Code Status: Full      Objective    Visit Vitals  /83 (BP Location: Left arm, Patient Position: Sitting, BP Cuff Size: Large adult)   Pulse 80   Resp 18        Interval history:  Patient is a 58 year old female with a history of DEVENDRA 3 with prior wide local excision with peripheral margins positive at 1-3 o'clock, last WLE 2022.  Here for annual exam. Patient  denies any vaginal bleeding, pink tinged discharge. Patient denies any constipation or diarrhea.  Appetite has been good.  Energy levels are baseline.  Patient denies hematuria.  Patient denies urinary leakage or incontinence. Patient is currently sexually active, denies dyspareunia or vaginal dryness. Mammogram is up to date.  Denies any vulvar pruritus or areas of concern.       Physical Exam:    Constitutional: Doing well. MEAGAN  Eyes: PERRL  ENMT: Moist mucus membranes  Head/Neck: Supple. Symmetrical  Cardiovascular: Regular, rate and rhythm. 2+ equal pulses of the extremities  Respiratory/Thorax: CTA. RRR. Chest rise symmetrical.  Gastrointestinal: Non-distended, soft, non-tender  Genitourinary: Cervical os visualized with no lesions, no CMT, small mobile uterus, no adnexal masses noted. Smooth vaginal walls.  Vulva with no lesions noted, well healed WLE.   Musculoskeletal: ROM intact, no joint swelling, normal strength  Extremities: No edema  Neurological: Alert and oriented x 3. Pleasant and cooperative.  Lymphatic: No lymphadenopathy. No lymphedema  Psychological: Appropriate mood and behavior  Skin: Warm and dry, no lesions, no rashes    A complete review of systems was performed and all systems were normal except what is noted in the interval history.          Assessment/Plan    Patient is a 58 year old female with a history of DEVENDRA 3 with prior wide local excision with peripheral margins positive at 1-3 o'clock, last WLE 11/2022. MEAGAN 1.5 years.       PLAN:  F/U in 1 year or as needed, alternating with OB-GYN  OB-GYN orders mammograms   Physical examination was within normal limits today.  She is currently MEAGAN.  We reviewed signs and symptoms of possible recurrence with the patient and she will call our office should she experience any of these.

## 2024-06-27 ENCOUNTER — OFFICE VISIT (OUTPATIENT)
Dept: GYNECOLOGIC ONCOLOGY | Facility: CLINIC | Age: 59
End: 2024-06-27
Payer: COMMERCIAL

## 2024-06-27 VITALS
BODY MASS INDEX: 41.87 KG/M2 | HEIGHT: 65 IN | HEART RATE: 80 BPM | SYSTOLIC BLOOD PRESSURE: 126 MMHG | WEIGHT: 251.32 LBS | RESPIRATION RATE: 18 BRPM | DIASTOLIC BLOOD PRESSURE: 83 MMHG

## 2024-06-27 DIAGNOSIS — N90.3 VULVAR DYSPLASIA: Primary | ICD-10-CM

## 2024-06-27 PROCEDURE — 4004F PT TOBACCO SCREEN RCVD TLK: CPT | Performed by: NURSE PRACTITIONER

## 2024-06-27 PROCEDURE — 3079F DIAST BP 80-89 MM HG: CPT | Performed by: NURSE PRACTITIONER

## 2024-06-27 PROCEDURE — 3074F SYST BP LT 130 MM HG: CPT | Performed by: NURSE PRACTITIONER

## 2024-06-27 PROCEDURE — 99213 OFFICE O/P EST LOW 20 MIN: CPT | Performed by: NURSE PRACTITIONER

## 2024-06-27 ASSESSMENT — PAIN SCALES - GENERAL: PAINLEVEL: 0-NO PAIN

## 2024-07-22 ENCOUNTER — HOSPITAL ENCOUNTER (OUTPATIENT)
Dept: RADIOLOGY | Facility: CLINIC | Age: 59
Discharge: HOME | End: 2024-07-22
Payer: COMMERCIAL

## 2024-07-22 ENCOUNTER — APPOINTMENT (OUTPATIENT)
Dept: ORTHOPEDIC SURGERY | Facility: CLINIC | Age: 59
End: 2024-07-22
Payer: COMMERCIAL

## 2024-07-22 DIAGNOSIS — M19.031 OSTEOARTHRITIS OF RIGHT WRIST, UNSPECIFIED OSTEOARTHRITIS TYPE: Primary | ICD-10-CM

## 2024-07-22 DIAGNOSIS — M19.031 OSTEOARTHRITIS OF RIGHT WRIST, UNSPECIFIED OSTEOARTHRITIS TYPE: ICD-10-CM

## 2024-07-22 PROCEDURE — 73110 X-RAY EXAM OF WRIST: CPT | Mod: RIGHT SIDE | Performed by: RADIOLOGY

## 2024-07-22 PROCEDURE — 99213 OFFICE O/P EST LOW 20 MIN: CPT | Performed by: ORTHOPAEDIC SURGERY

## 2024-07-22 PROCEDURE — 73110 X-RAY EXAM OF WRIST: CPT | Mod: RT

## 2024-07-22 PROCEDURE — 4004F PT TOBACCO SCREEN RCVD TLK: CPT | Performed by: ORTHOPAEDIC SURGERY

## 2024-07-22 PROCEDURE — 20606 DRAIN/INJ JOINT/BURSA W/US: CPT | Performed by: ORTHOPAEDIC SURGERY

## 2024-07-22 RX ORDER — LIDOCAINE HYDROCHLORIDE 10 MG/ML
1 INJECTION INFILTRATION; PERINEURAL
Status: COMPLETED | OUTPATIENT
Start: 2024-07-22 | End: 2024-07-22

## 2024-07-22 RX ORDER — METHYLPREDNISOLONE ACETATE 40 MG/ML
30 INJECTION, SUSPENSION INTRA-ARTICULAR; INTRALESIONAL; INTRAMUSCULAR; SOFT TISSUE
Status: COMPLETED | OUTPATIENT
Start: 2024-07-22 | End: 2024-07-22

## 2024-07-22 ASSESSMENT — ENCOUNTER SYMPTOMS
SHORTNESS OF BREATH: 0
CHILLS: 0
SINUS PAIN: 0
ARTHRALGIAS: 1
FEVER: 0
FATIGUE: 0
WHEEZING: 0

## 2024-07-22 ASSESSMENT — PAIN SCALES - GENERAL: PAINLEVEL_OUTOF10: 7

## 2024-07-22 ASSESSMENT — PAIN - FUNCTIONAL ASSESSMENT: PAIN_FUNCTIONAL_ASSESSMENT: 0-10

## 2024-07-22 NOTE — PROGRESS NOTES
Reason for Appointment  Chief Complaint   Patient presents with    Right Wrist - Injections     History of Present Illness  Patient is a 59 y.o. female here today for follow-up evaluation of recurrent right wrist pain.  She had a previous right wrist joint injection that did give her good relief.  She does not get much numbness and tingling in the fingers, pain is deep in the wrist joint worse with flexion and extension and repetitive activity.  She does have a wrist brace that she wears.  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  No other changes in her past medical history, allergies, or medications    History reviewed. No pertinent past medical history.    Past Surgical History:   Procedure Laterality Date    COLONOSCOPY  06/02/2020    US GUIDED THYROID BIOPSY  04/20/2015    US GUIDED THYROID BIOPSY [facility]       Medication Documentation Review Audit       Reviewed by Sherine Sandy PA-C (Physician Assistant) on 07/22/24 at 0902      Medication Order Taking? Sig Documenting Provider Last Dose Status   albuterol (ProAir HFA) 90 mcg/actuation inhaler [id number] Yes Inhale 2 puffs every 6 hours. KENRICK King-CNP Taking Active   atorvastatin (Lipitor) 80 mg tablet [id number] Yes Take 1 tablet (80 mg) by mouth once daily. Historical Provider, MD Taking Active   Bifidobacterium infantis (Align) 4 mg capsule [id number] Yes as directed Orally Historical Provider, MD Taking Active   budesonide-formoteroL (Symbicort) 160-4.5 mcg/actuation inhaler [id number] Yes Inhale 2 puffs 2 times a day. Rinse mouth with water after use to reduce aftertaste and incidence of candidiasis. Do not swallow. KENRICK King-CNP Taking Active   calcium carbonate-vitamin D3 (Calcium 600 + D,3,) 600 mg-5 mcg (200 unit) capsule [id number] Yes Take 1 tablet by mouth once every 24 hours. With a meal Historical Provider,  MD Taking Active   levothyroxine (Synthroid, Levoxyl) 112 mcg tablet [id number] Yes Take 1 tablet (112 mcg) by mouth once daily. Lindsey Montes De Oca, APRN-CNP Taking Active   methylPREDNISolone acetate (DEPO-Medrol) injection 40 mg [id number]   Sherine Sandy PA-C  Active   metoprolol succinate XL (Toprol-XL) 50 mg 24 hr tablet [id number] Yes Take 1 tablet (50 mg) by mouth once daily. Historical Provider, MD Taking Active   MULTIVITAMIN ORAL [id number] Yes Take 1 tablet by mouth once daily. Historical Provider, MD Taking Active   pantoprazole (ProtoNix) 40 mg EC tablet [id number] Yes Take 1 tablet (40 mg) by mouth once daily. Historical Provider, MD Taking Active   psyllium (MetamuciL) 0.4 gram capsule [id number] Yes Take 2,000 mg by mouth. Historical Provider, MD Taking Active   sucralfate (Carafate) 1 gram tablet [id number] Yes Take 1 tablet (1 g) by mouth once daily. Historical Provider, MD Taking Active   traMADol (Ultram) 50 mg tablet [id number] Yes Take 1 tablet (50 mg) by mouth every 8 hours if needed for moderate pain (4 - 6). not relieved by Ibuprofen or Acetominophen Historical Provider, MD Taking Active                    Allergies   Allergen Reactions    Penicillin Unknown       Review of Systems   Constitutional:  Negative for chills, fatigue and fever.   HENT:  Negative for mouth sores, sinus pain and tinnitus.    Respiratory:  Negative for shortness of breath and wheezing.    Cardiovascular:  Negative for chest pain and leg swelling.   Musculoskeletal:  Positive for arthralgias.   Skin:  Negative for pallor and rash.     Exam   On exam the right wrist shows no severe swelling, she is tenderness over the radiocarpal joint.  Pain with hyperflexion and extension with no gross instability.  Tinel's over the right median nerve.  Good digital motion with no triggering.  Good pulses and sensation in the upper extremity.    Assessment   Encounter Diagnosis   Name Primary?    Osteoarthritis of right wrist,  unspecified osteoarthritis type      Plan   We sterilely injected under ultrasound guidance Depo-Medrol lidocaine into the right wrist joint.  Patient understands the small risk of infection and the signs look for as well as flare reaction.  Hopefully this gives her good relief.  With the arthritic change seen in the wrist, occasional injections and bracing are the mainstay of conservative treatment.  She can follow-up with us as needed.    Patient ID: Izzy Cantor is a 59 y.o. female.    M Inj/Asp: R radiocarpal on 7/22/2024 9:19 AM  Indications: pain  Details: 25 G needle, ultrasound-guided  Medications: 1 mL lidocaine 10 mg/mL (1 %); 30 mg methylPREDNISolone acetate 40 mg/mL  Outcome: tolerated well, no immediate complications    After discussing the risks and benefits of the procedure we proceeded with an injection. We identified the radiocarpal joint under ultrasound guidance, images obtained. We sterilely injected the right wrist joint with 30 mg of Depo Medrol and .5 cc of 1% lidocaine. Pt tolerated the procedure well without any adverse effects.   Procedure, treatment alternatives, risks and benefits explained, specific risks discussed. Consent was given by the patient. Immediately prior to procedure a time out was called to verify the correct patient, procedure, equipment, support staff and site/side marked as required. Patient was prepped and draped in the usual sterile fashion.       Written by Sherine Trivedi saw, evaluated, and treated the patient with the PA

## 2024-09-10 ENCOUNTER — HOSPITAL ENCOUNTER (OUTPATIENT)
Dept: RADIOLOGY | Facility: HOSPITAL | Age: 59
Discharge: HOME | End: 2024-09-10
Payer: COMMERCIAL

## 2024-09-10 VITALS — WEIGHT: 250 LBS | BODY MASS INDEX: 41.6 KG/M2

## 2024-09-10 DIAGNOSIS — N64.4 MASTODYNIA: ICD-10-CM

## 2024-09-10 PROCEDURE — 77061 BREAST TOMOSYNTHESIS UNI: CPT | Mod: RT

## 2024-09-10 PROCEDURE — 77062 BREAST TOMOSYNTHESIS BI: CPT | Performed by: RADIOLOGY

## 2024-09-10 PROCEDURE — 77062 BREAST TOMOSYNTHESIS BI: CPT

## 2024-09-10 PROCEDURE — 77066 DX MAMMO INCL CAD BI: CPT | Performed by: RADIOLOGY

## 2024-09-25 ENCOUNTER — APPOINTMENT (OUTPATIENT)
Dept: ORTHOPEDIC SURGERY | Facility: CLINIC | Age: 59
End: 2024-09-25
Payer: COMMERCIAL

## 2024-09-25 DIAGNOSIS — M17.11 ARTHRITIS OF RIGHT KNEE: Primary | ICD-10-CM

## 2024-09-25 PROCEDURE — 20610 DRAIN/INJ JOINT/BURSA W/O US: CPT

## 2024-09-25 PROCEDURE — 99214 OFFICE O/P EST MOD 30 MIN: CPT

## 2024-09-25 PROCEDURE — 4004F PT TOBACCO SCREEN RCVD TLK: CPT

## 2024-09-25 RX ORDER — TRIAMCINOLONE ACETONIDE 40 MG/ML
1 INJECTION, SUSPENSION INTRA-ARTICULAR; INTRAMUSCULAR
Status: COMPLETED | OUTPATIENT
Start: 2024-09-25 | End: 2024-09-25

## 2024-09-25 ASSESSMENT — PAIN SCALES - GENERAL
PAINLEVEL_OUTOF10: 7
PAINLEVEL_OUTOF10: 6

## 2024-09-25 ASSESSMENT — PAIN - FUNCTIONAL ASSESSMENT: PAIN_FUNCTIONAL_ASSESSMENT: 0-10

## 2024-09-25 NOTE — PROGRESS NOTES
This is a consultation from Dr. Lindsey Montes De Oca, APRN-CNP for   Chief Complaint   Patient presents with    Right Knee - Pain       This is a 59 y.o. female who presents for evaluation of right knee pain.  Patient states that she has chronic right knee pain that she has been treating with steroid injections that have been working well for her.  She states there has been recent exacerbation of knee pain and that has become more difficult to walk and climb stairs.  Patient states that her pain is mostly located in the medial aspect of the knee.  Patient states that she takes some anti-inflammatories as needed when the pain gets bad.  Patient denies numbness or tingling or distal extremities.    Physical Exam    There has been no interval change in this patient's past medical, surgical, medications, allergies, family history or social history since the most recent visit to a provider within our department. 14 point review of systems was performed, reviewed, and negative except for pertinent positives documented in the history of present illness.     Constitutional: well developed, well nourished female in no acute distress  Psychiatric: normal mood, appropriate affect  Eyes: sclera anicteric  HENT: normocephalic/atraumatic  CV: regular rate and rhythm   Respiratory: non labored breathing  Integumentary: no rash  Neurological: moves all extremities    Right knee exam: skin intact no lacerations or abrations. no effusion.  Tender medial joint line. negative log roll negative patellar grind. ROM 0-120. stable to varus and valgus stress at 0 and 30 degrees. negative lachman negative posterior drawer negative geoff. 5/5 ehl/fhl/gs/ta. silt s/s/sp/dp/t. 2+ dp/pt      L Inj/Asp: R knee on 9/25/2024 12:32 PM  Indications: pain and joint swelling  Details: 22 G needle, anterolateral approach  Medications: 1 mL triamcinolone acetonide 40 mg/mL    Discussion:  I discussed the conservative treatment options for knee  osteoarthritis including but not limited to physical therapy, oral NSAIDS, activity and lifestyle modification, and corticosteroid injections. Pt has elected to undergo a cortisone injection today. I have explained the risk and benefits of an injection including the possibility of joint infection, bleeding, damage to cartilage, allergic reaction. Patient verbalized understanding and gave verbal consent wishes to proceed with a intra-articular cortisone injection for their knee.    Procedure:  After discussing the risk and benefits of the procedure, we proceeded with an intra-articular right knee injection. We discussed the risks and benefits and potential morbidity related to the treatment, and to the prescription medication administered in the injection    With the patient's informed verbal consent, the right knee was prepped in standard sterile fashion with Chlorhexidine. The skin was then anesthetized with ethyl chloride spray and cleaned again with Chlorhexidine. The knee was then apirated/injected with a prefilled 20-gauge syringe of 40 mg Kenalog + 4 ml Lidocaine using the lateral approach without complications.  The patient tolerated this well and felt immediate initial relief of symptoms. A bandaid was applied and the patient ambulated out of the clinic on ther own accord without difficulty. Patient was instructed to avoid physical activity for 24-48 hours to prevent the knees from swelling and may ice the knees as tolerated. Patient should contact the office if any signs of of infection appear: redness, fever, chills, drainage, swelling or warmth to the knees.  Pt understands that the injections can be repeated no sooner than 3 months.    Procedure, treatment alternatives, risks and benefits explained, specific risks discussed. Consent was given by the patient. Immediately prior to procedure a time out was called to verify the correct patient, procedure, equipment, support staff and site/side marked as  "required. Patient was prepped and draped in the usual sterile fashion.             Impression/Plan: This is a 59 y.o. female with a recent exacerbation of right knee pain.  I had an in depth discussion with the patient regarding treatment options for arthritis and their relative risks and benefits. We reviewed surgical and nonsurgical option for treatment. Treatments include anti inflammatory medications, physical therapy, weight loss, activity modification, use of assistive devices, injection therapies. We discussed current prescriptions and risks and benefits of continuation of prescription medication as apporpriate. We discussed that arthritis is often progressive over time, an in end stage arthritis surgical interventions can be considered, including arthroplasty. All questions were answered and the patient voiced their understanding.  We did a right steroid injection today and she is welcome back in 3 months or more for another.    BMI Readings from Last 1 Encounters:   09/10/24 41.60 kg/m²      Lab Results   Component Value Date    CREATININE 0.82 03/16/2024     Tobacco Use: High Risk (9/25/2024)    Patient History     Smoking Tobacco Use: Every Day     Smokeless Tobacco Use: Never     Passive Exposure: Not on file      Computed MELD 3.0 unavailable. One or more values for this score either were not found within the given timeframe or did not fit some other criterion.  Computed MELD-Na unavailable. One or more values for this score either were not found within the given timeframe or did not fit some other criterion.       Lab Results   Component Value Date    HGBA1C 5.7 (H) 03/16/2024     No results found for: \"STAPHMRSASCR\"  "

## 2024-10-03 ENCOUNTER — APPOINTMENT (OUTPATIENT)
Age: 59
End: 2024-10-03
Payer: COMMERCIAL

## 2024-10-03 VITALS
BODY MASS INDEX: 41.82 KG/M2 | OXYGEN SATURATION: 96 % | HEIGHT: 65 IN | HEART RATE: 73 BPM | DIASTOLIC BLOOD PRESSURE: 70 MMHG | WEIGHT: 251 LBS | RESPIRATION RATE: 18 BRPM | SYSTOLIC BLOOD PRESSURE: 126 MMHG | TEMPERATURE: 98 F

## 2024-10-03 DIAGNOSIS — I10 PRIMARY HYPERTENSION: Primary | ICD-10-CM

## 2024-10-03 DIAGNOSIS — I35.0 NONRHEUMATIC AORTIC VALVE STENOSIS: ICD-10-CM

## 2024-10-03 DIAGNOSIS — E78.2 MIXED HYPERLIPIDEMIA: ICD-10-CM

## 2024-10-03 DIAGNOSIS — I35.0 AORTIC VALVE STENOSIS, ETIOLOGY OF CARDIAC VALVE DISEASE UNSPECIFIED: ICD-10-CM

## 2024-10-03 DIAGNOSIS — E78.2 MIXED DYSLIPIDEMIA: ICD-10-CM

## 2024-10-03 PROCEDURE — 99204 OFFICE O/P NEW MOD 45 MIN: CPT | Performed by: INTERNAL MEDICINE

## 2024-10-03 PROCEDURE — 3078F DIAST BP <80 MM HG: CPT | Performed by: INTERNAL MEDICINE

## 2024-10-03 PROCEDURE — 3008F BODY MASS INDEX DOCD: CPT | Performed by: INTERNAL MEDICINE

## 2024-10-03 PROCEDURE — 3074F SYST BP LT 130 MM HG: CPT | Performed by: INTERNAL MEDICINE

## 2024-10-03 PROCEDURE — 4004F PT TOBACCO SCREEN RCVD TLK: CPT | Performed by: INTERNAL MEDICINE

## 2024-10-03 RX ORDER — METOPROLOL SUCCINATE 50 MG/1
50 TABLET, EXTENDED RELEASE ORAL DAILY
Qty: 90 TABLET | Refills: 3 | Status: SHIPPED | OUTPATIENT
Start: 2024-10-03 | End: 2025-10-03

## 2024-10-03 RX ORDER — ATORVASTATIN CALCIUM 80 MG/1
80 TABLET, FILM COATED ORAL DAILY
Qty: 90 TABLET | Refills: 3 | Status: SHIPPED | OUTPATIENT
Start: 2024-10-03 | End: 2025-10-03

## 2024-10-03 ASSESSMENT — PATIENT HEALTH QUESTIONNAIRE - PHQ9
1. LITTLE INTEREST OR PLEASURE IN DOING THINGS: NOT AT ALL
2. FEELING DOWN, DEPRESSED OR HOPELESS: NOT AT ALL
SUM OF ALL RESPONSES TO PHQ9 QUESTIONS 1 AND 2: 0

## 2024-10-03 ASSESSMENT — ENCOUNTER SYMPTOMS
LOSS OF SENSATION IN FEET: 0
OCCASIONAL FEELINGS OF UNSTEADINESS: 0
DEPRESSION: 0

## 2024-10-03 ASSESSMENT — LIFESTYLE VARIABLES
HAVE YOU OR SOMEONE ELSE BEEN INJURED AS A RESULT OF YOUR DRINKING: NO
HOW MANY STANDARD DRINKS CONTAINING ALCOHOL DO YOU HAVE ON A TYPICAL DAY: PATIENT DOES NOT DRINK
SKIP TO QUESTIONS 9-10: 1
AUDIT TOTAL SCORE: 0
HOW OFTEN DO YOU HAVE SIX OR MORE DRINKS ON ONE OCCASION: NEVER
AUDIT-C TOTAL SCORE: 0
HAS A RELATIVE, FRIEND, DOCTOR, OR ANOTHER HEALTH PROFESSIONAL EXPRESSED CONCERN ABOUT YOUR DRINKING OR SUGGESTED YOU CUT DOWN: NO
HOW OFTEN DO YOU HAVE A DRINK CONTAINING ALCOHOL: NEVER

## 2024-10-03 ASSESSMENT — PAIN SCALES - GENERAL: PAINLEVEL: 0-NO PAIN

## 2024-10-03 NOTE — PROGRESS NOTES
Consulting Physician:  Dr. Dexter Coats    Reason for the consult:  Aortic valve stenosis, coronary disease, hypertension.    History of present illness:  This is a very pleasant 59-year-old female with history of mild aortic valve stenosis, hypertension, obesity, hyperlipidemia.  Patient used to see Dr. Coats.  She had a cardiac catheterization 2017 that showed ectatic arteries and nonobstructive coronary artery disease.  Patient has also mild  aortic valve stenosis with aortic valve area of of 1.8 cm² back in November 2022.  Patient used to see Dr. Coats for years.  Comes to my office to establish care.  Denies having any chest pain.  She has chronic shortness of breath with mild to moderate activity related to COPD smoking and obesity.  Patient denies having dizziness lightheadedness or syncope.    History reviewed. No pertinent past medical history.    Past Surgical History:   Procedure Laterality Date    COLONOSCOPY  06/02/2020    US GUIDED THYROID BIOPSY  04/20/2015    US GUIDED THYROID BIOPSY [facility]       Allergies   Allergen Reactions    Penicillin Unknown        reports that she has been smoking cigarettes. She started smoking about 40 years ago. She has a 40.8 pack-year smoking history. She has been exposed to tobacco smoke. She has never used smokeless tobacco. She reports that she does not currently use alcohol. She reports that she does not use drugs.    No family history on file.    Patient's Medications   New Prescriptions    No medications on file   Previous Medications    ALBUTEROL (PROAIR HFA) 90 MCG/ACTUATION INHALER    Inhale 2 puffs every 6 hours.    ATORVASTATIN (LIPITOR) 80 MG TABLET    Take 1 tablet (80 mg) by mouth once daily.    BIFIDOBACTERIUM INFANTIS (ALIGN) 4 MG CAPSULE    as directed Orally    BUDESONIDE-FORMOTEROL (SYMBICORT) 160-4.5 MCG/ACTUATION INHALER    Inhale 2 puffs 2 times a day. Rinse mouth with water after use to reduce aftertaste and incidence of  candidiasis. Do not swallow.    CALCIUM CARBONATE-VITAMIN D3 (CALCIUM 600 + D,3,) 600 MG-5 MCG (200 UNIT) CAPSULE    Take 1 tablet by mouth once every 24 hours. With a meal    LEVOTHYROXINE (SYNTHROID, LEVOXYL) 112 MCG TABLET    Take 1 tablet (112 mcg) by mouth once daily.    METOPROLOL SUCCINATE XL (TOPROL-XL) 50 MG 24 HR TABLET    Take 1 tablet (50 mg) by mouth once daily.    MULTIVITAMIN ORAL    Take 1 tablet by mouth once daily.    PANTOPRAZOLE (PROTONIX) 40 MG EC TABLET    Take 1 tablet (40 mg) by mouth once daily.    PSYLLIUM (METAMUCIL) 0.4 GRAM CAPSULE    Take 2,000 mg by mouth.    SUCRALFATE (CARAFATE) 1 GRAM TABLET    Take 1 tablet (1 g) by mouth once daily.    TRAMADOL (ULTRAM) 50 MG TABLET    Take 1 tablet (50 mg) by mouth every 8 hours if needed for moderate pain (4 - 6). not relieved by Ibuprofen or Acetominophen   Modified Medications    No medications on file   Discontinued Medications    No medications on file         Review of Systems  10 point review of systems otherwise negative     Objective   Physical Exam  General: Patient in no acute distress   HEENT: Atraumatic normocephalic.  Neck: Supple, jugular venous pressure within normal limit.  No bruits  Lungs: Clear to auscultation bilaterally  Cardiovascular: Regular rate and rhythm, normal heart sounds, 3 out of 6 ejection systolic murmur with preserved S2   abdomen: Soft nontender nondistended.  Normal bowel sounds.  Extremities: Warm to touch, no edema.  Neurologic examination: Patient is awake alert oriented x3.  Psychiatric examination: Patient denies being depressed or suicidal.  Good insight  Vascular examination: Pulses intact bilaterally     Lab Review   No visits with results within 2 Month(s) from this visit.   Latest known visit with results is:   Lab on 03/16/2024   Component Date Value    WBC 03/16/2024 7.3     nRBC 03/16/2024 0.0     RBC 03/16/2024 4.77     Hemoglobin 03/16/2024 15.1     Hematocrit 03/16/2024 45.6     MCV 03/16/2024  96     MCH 03/16/2024 31.7     MCHC 03/16/2024 33.1     RDW 03/16/2024 12.3     Platelets 03/16/2024 236     Neutrophils % 03/16/2024 47.0     Immature Granulocytes %,* 03/16/2024 0.8     Lymphocytes % 03/16/2024 33.8     Monocytes % 03/16/2024 13.1     Eosinophils % 03/16/2024 4.1     Basophils % 03/16/2024 1.2     Neutrophils Absolute 03/16/2024 3.43     Immature Granulocytes Ab* 03/16/2024 0.06     Lymphocytes Absolute 03/16/2024 2.47     Monocytes Absolute 03/16/2024 0.96     Eosinophils Absolute 03/16/2024 0.30     Basophils Absolute 03/16/2024 0.09     Glucose 03/16/2024 82     Sodium 03/16/2024 141     Potassium 03/16/2024 4.4     Chloride 03/16/2024 103     Bicarbonate 03/16/2024 31     Anion Gap 03/16/2024 11     Urea Nitrogen 03/16/2024 22     Creatinine 03/16/2024 0.82     eGFR 03/16/2024 82     Calcium 03/16/2024 8.7     Albumin 03/16/2024 3.9     Alkaline Phosphatase 03/16/2024 71     Total Protein 03/16/2024 6.3 (L)     AST 03/16/2024 19     Bilirubin, Total 03/16/2024 0.7     ALT 03/16/2024 25     Hemoglobin A1C 03/16/2024 5.7 (H)     Estimated Average Glucose 03/16/2024 117     Cholesterol 03/16/2024 141     HDL-Cholesterol 03/16/2024 42.3     Cholesterol/HDL Ratio 03/16/2024 3.3     LDL Calculated 03/16/2024 64     VLDL 03/16/2024 35     Triglycerides 03/16/2024 174 (H)     Non HDL Cholesterol 03/16/2024 99     Color, Urine 03/16/2024 Yellow     Appearance, Urine 03/16/2024 Hazy (N)     Specific Gravity, Urine 03/16/2024 1.019     pH, Urine 03/16/2024 5.0     Protein, Urine 03/16/2024 NEGATIVE     Glucose, Urine 03/16/2024 NEGATIVE     Blood, Urine 03/16/2024 NEGATIVE     Ketones, Urine 03/16/2024 NEGATIVE     Bilirubin, Urine 03/16/2024 NEGATIVE     Urobilinogen, Urine 03/16/2024 <2.0     Nitrite, Urine 03/16/2024 NEGATIVE     Leukocyte Esterase, Urine 03/16/2024 TRACE (A)     Albumin, Urine Random 03/16/2024 <7.0     Creatinine, Urine Random 03/16/2024 110.1     Albumin/Creatinine Ratio 03/16/2024       Thyroid Stimulating Horm* 03/16/2024 0.64     WBC, Urine 03/16/2024 1-5     RBC, Urine 03/16/2024 NONE     Squamous Epithelial Cell* 03/16/2024 1-9 (SPARSE)     Mucus, Urine 03/16/2024 FEW     Hyaline Casts, Urine 03/16/2024 OCCASIONAL (A)         Assessment/Plan    This is a very pleasant 59-year-old female with history of mild aortic valve stenosis, hypertension, obesity, hyperlipidemia.  Patient used to see Dr. Coats.  She had a cardiac catheterization 2017 that showed ectatic arteries and nonobstructive coronary artery disease.  Patient has also mild  aortic valve stenosis with aortic valve area of of 1.8 cm² back in November 2022.  Patient used to see Dr. Coats for years.  Comes to my office to establish care.  Denies having any chest pain.  She has chronic shortness of breath with mild to moderate activity related to COPD smoking and obesity.  Patient denies having dizziness lightheadedness or syncope.  At this point my recommendation is to repeat another 2D echo to assess the severity of her aortic valve stenosis.  She has 3 out of 6 ejection systolic murmur with preserved S2.  I recommend her to be on aspirin 81 mg oral daily.  Patient had very ectatic arteries with some luminal irregularities to prevent cardiovascular events.  Her LDL at target.  Blood pressure and heart rate well-controlled.  Will follow-up in 6 months.  Discussed with her lifestyle modification smoking cessation and weight loss.    Jodie Abdalla MD

## 2024-10-15 ENCOUNTER — APPOINTMENT (OUTPATIENT)
Age: 59
End: 2024-10-15
Payer: COMMERCIAL

## 2024-11-12 ENCOUNTER — HOSPITAL ENCOUNTER (OUTPATIENT)
Dept: RADIOLOGY | Facility: CLINIC | Age: 59
Discharge: HOME | End: 2024-11-12
Payer: COMMERCIAL

## 2024-11-12 ENCOUNTER — LAB (OUTPATIENT)
Dept: LAB | Facility: LAB | Age: 59
End: 2024-11-12
Payer: COMMERCIAL

## 2024-11-12 DIAGNOSIS — R10.9 UNSPECIFIED ABDOMINAL PAIN: Primary | ICD-10-CM

## 2024-11-12 DIAGNOSIS — R10.9 UNSPECIFIED ABDOMINAL PAIN: ICD-10-CM

## 2024-11-12 LAB
BUN SERPL-MCNC: 20 MG/DL (ref 6–23)
CREAT SERPL-MCNC: 0.84 MG/DL (ref 0.5–1.05)
EGFRCR SERPLBLD CKD-EPI 2021: 79 ML/MIN/1.73M*2

## 2024-11-12 PROCEDURE — 36415 COLL VENOUS BLD VENIPUNCTURE: CPT

## 2024-11-12 PROCEDURE — 84520 ASSAY OF UREA NITROGEN: CPT

## 2024-11-12 PROCEDURE — 74177 CT ABD & PELVIS W/CONTRAST: CPT

## 2024-11-12 PROCEDURE — 2550000001 HC RX 255 CONTRASTS

## 2024-11-12 PROCEDURE — 74177 CT ABD & PELVIS W/CONTRAST: CPT | Performed by: RADIOLOGY

## 2024-11-12 PROCEDURE — A9698 NON-RAD CONTRAST MATERIALNOC: HCPCS

## 2024-11-12 PROCEDURE — 82565 ASSAY OF CREATININE: CPT

## 2024-11-14 ENCOUNTER — LAB (OUTPATIENT)
Dept: LAB | Facility: LAB | Age: 59
End: 2024-11-14
Payer: COMMERCIAL

## 2024-11-14 DIAGNOSIS — R10.30 LOWER ABDOMINAL PAIN, UNSPECIFIED: Primary | ICD-10-CM

## 2024-11-14 LAB
APPEARANCE UR: ABNORMAL
BASOPHILS # BLD AUTO: 0.05 X10*3/UL (ref 0–0.1)
BASOPHILS NFR BLD AUTO: 0.9 %
BILIRUB UR STRIP.AUTO-MCNC: NEGATIVE MG/DL
COLOR UR: YELLOW
EOSINOPHIL # BLD AUTO: 0.21 X10*3/UL (ref 0–0.7)
EOSINOPHIL NFR BLD AUTO: 3.8 %
ERYTHROCYTE [DISTWIDTH] IN BLOOD BY AUTOMATED COUNT: 12.4 % (ref 11.5–14.5)
GLUCOSE UR STRIP.AUTO-MCNC: NORMAL MG/DL
HCT VFR BLD AUTO: 45.5 % (ref 36–46)
HGB BLD-MCNC: 14.9 G/DL (ref 12–16)
IMM GRANULOCYTES # BLD AUTO: 0.01 X10*3/UL (ref 0–0.7)
IMM GRANULOCYTES NFR BLD AUTO: 0.2 % (ref 0–0.9)
KETONES UR STRIP.AUTO-MCNC: NEGATIVE MG/DL
LEUKOCYTE ESTERASE UR QL STRIP.AUTO: NEGATIVE
LYMPHOCYTES # BLD AUTO: 2.46 X10*3/UL (ref 1.2–4.8)
LYMPHOCYTES NFR BLD AUTO: 43.9 %
MCH RBC QN AUTO: 31.8 PG (ref 26–34)
MCHC RBC AUTO-ENTMCNC: 32.7 G/DL (ref 32–36)
MCV RBC AUTO: 97 FL (ref 80–100)
MONOCYTES # BLD AUTO: 0.65 X10*3/UL (ref 0.1–1)
MONOCYTES NFR BLD AUTO: 11.6 %
MUCOUS THREADS #/AREA URNS AUTO: NORMAL /LPF
NEUTROPHILS # BLD AUTO: 2.22 X10*3/UL (ref 1.2–7.7)
NEUTROPHILS NFR BLD AUTO: 39.6 %
NITRITE UR QL STRIP.AUTO: NEGATIVE
NRBC BLD-RTO: 0 /100 WBCS (ref 0–0)
PH UR STRIP.AUTO: 6.5 [PH]
PLATELET # BLD AUTO: 233 X10*3/UL (ref 150–450)
PROT UR STRIP.AUTO-MCNC: ABNORMAL MG/DL
RBC # BLD AUTO: 4.68 X10*6/UL (ref 4–5.2)
RBC # UR STRIP.AUTO: NEGATIVE /UL
RBC #/AREA URNS AUTO: NORMAL /HPF
SP GR UR STRIP.AUTO: 1.03
SQUAMOUS #/AREA URNS AUTO: NORMAL /HPF
UROBILINOGEN UR STRIP.AUTO-MCNC: NORMAL MG/DL
WBC # BLD AUTO: 5.6 X10*3/UL (ref 4.4–11.3)
WBC #/AREA URNS AUTO: NORMAL /HPF

## 2024-11-14 PROCEDURE — 36415 COLL VENOUS BLD VENIPUNCTURE: CPT

## 2024-11-14 PROCEDURE — 85025 COMPLETE CBC W/AUTO DIFF WBC: CPT

## 2024-11-14 PROCEDURE — 81001 URINALYSIS AUTO W/SCOPE: CPT

## 2024-12-03 ENCOUNTER — APPOINTMENT (OUTPATIENT)
Dept: RADIOLOGY | Facility: HOSPITAL | Age: 59
End: 2024-12-03
Payer: COMMERCIAL

## 2024-12-10 NOTE — TELEPHONE ENCOUNTER
Attempted to call patient, no answer, left message for patient to call back regarding her concerns.   17.9

## 2024-12-19 ENCOUNTER — APPOINTMENT (OUTPATIENT)
Dept: ORTHOPEDIC SURGERY | Facility: CLINIC | Age: 59
End: 2024-12-19
Payer: COMMERCIAL

## 2024-12-19 DIAGNOSIS — M17.11 ARTHRITIS OF RIGHT KNEE: Primary | ICD-10-CM

## 2024-12-19 PROCEDURE — 4004F PT TOBACCO SCREEN RCVD TLK: CPT | Performed by: ORTHOPAEDIC SURGERY

## 2024-12-19 PROCEDURE — 99214 OFFICE O/P EST MOD 30 MIN: CPT | Performed by: ORTHOPAEDIC SURGERY

## 2024-12-19 PROCEDURE — 20610 DRAIN/INJ JOINT/BURSA W/O US: CPT | Performed by: ORTHOPAEDIC SURGERY

## 2024-12-19 RX ORDER — TRIAMCINOLONE ACETONIDE 40 MG/ML
1 INJECTION, SUSPENSION INTRA-ARTICULAR; INTRAMUSCULAR
Status: COMPLETED | OUTPATIENT
Start: 2024-12-19 | End: 2024-12-19

## 2024-12-19 ASSESSMENT — PAIN SCALES - GENERAL: PAINLEVEL_OUTOF10: 6

## 2024-12-19 ASSESSMENT — PAIN - FUNCTIONAL ASSESSMENT: PAIN_FUNCTIONAL_ASSESSMENT: 0-10

## 2024-12-19 NOTE — PROGRESS NOTES
This is a consultation from Dr. Lindsey Montes De Oca, APRN-CNP for   Chief Complaint   Patient presents with    Right Knee - Pain       This is a 59 y.o. female who presents for follow-up for her right knee.  Patient has right knee arthritis, she had cortisone injection about 3 months ago.  It was helpful and significantly improved her symptoms.  Since then she has had return of her symptoms last few weeks, sharp pain over the medial knee worse with walking proving with rest.  Injection last about a month.  No numbness or tingling no fevers or chills.    Physical Exam    There has been no interval change in this patient's past medical, surgical, medications, allergies, family history or social history since the most recent visit to a provider within our department. 14 point review of systems was performed, reviewed, and negative except for pertinent positives documented in the history of present illness.     Constitutional: well developed, well nourished female in no acute distress  Psychiatric: normal mood, appropriate affect  Eyes: sclera anicteric  HENT: normocephalic/atraumatic  CV: regular rate and rhythm   Respiratory: non labored breathing  Integumentary: no rash  Neurological: moves all extremities    Right knee exam: skin intact no lacerations or abrations. no effusion.  Tender medial joint line. negative log roll negative patellar grind. ROM 0-120. stable to varus and valgus stress at 0 and 30 degrees. negative lachman negative posterior drawer negative geoff. 5/5 ehl/fhl/gs/ta. silt s/s/sp/dp/t. 2+ dp/pt        L Inj/Asp: R knee on 12/19/2024 11:16 AM  Indications: pain and joint swelling  Details: 22 G needle, anterolateral approach  Medications: 1 mL triamcinolone acetonide 40 mg/mL    Discussion:  I discussed the conservative treatment options for knee osteoarthritis including but not limited to physical therapy, oral NSAIDS, activity and lifestyle modification, and corticosteroid injections. Pt has  elected to undergo a cortisone injection today. I have explained the risk and benefits of an injection including the possibility of joint infection, bleeding, damage to cartilage, allergic reaction. Patient verbalized understanding and gave verbal consent wishes to proceed with a intra-articular cortisone injection for their knee.    Procedure:  After discussing the risk and benefits of the procedure, we proceeded with an intra-articular right knee injection. We discussed the risks and benefits and potential morbidity related to the treatment, and to the prescription medication administered in the injection    With the patient's informed verbal consent, the right knee was prepped in standard sterile fashion with Chlorhexidine. The skin was then anesthetized with ethyl chloride spray and cleaned again with Chlorhexidine. The knee was then apirated/injected with a prefilled 20-gauge syringe of 40 mg Kenalog + 4 ml Lidocaine using the lateral approach without complications.  The patient tolerated this well and felt immediate initial relief of symptoms. A bandaid was applied and the patient ambulated out of the clinic on ther own accord without difficulty. Patient was instructed to avoid physical activity for 24-48 hours to prevent the knees from swelling and may ice the knees as tolerated. Patient should contact the office if any signs of of infection appear: redness, fever, chills, drainage, swelling or warmth to the knees.  Pt understands that the injections can be repeated no sooner than 3 months.    Procedure, treatment alternatives, risks and benefits explained, specific risks discussed. Consent was given by the patient. Immediately prior to procedure a time out was called to verify the correct patient, procedure, equipment, support staff and site/side marked as required. Patient was prepped and draped in the usual sterile fashion.             Impression/Plan: This is a 59 y.o. female with right knee arthritis.  I  "had an in depth discussion with the patient regarding treatment options for arthritis and their relative risks and benefits. We reviewed surgical and nonsurgical option for treatment. Treatments include anti inflammatory medications, physical therapy, weight loss, activity modification, use of assistive devices, injection therapies. We discussed current prescriptions and risks and benefits of continuation of prescription medication as apporpriate. We discussed that arthritis is often progressive over time, an in end stage arthritis surgical interventions can be considered, including arthroplasty. All questions were answered and the patient voiced their understanding.  Discussed smoking cessation for future surgery.  I will see her back as needed    BMI Readings from Last 1 Encounters:   10/03/24 41.77 kg/m²      Lab Results   Component Value Date    CREATININE 0.84 11/12/2024     Tobacco Use: High Risk (12/19/2024)    Patient History     Smoking Tobacco Use: Every Day     Smokeless Tobacco Use: Never     Passive Exposure: Current      Computed MELD 3.0 unavailable. One or more values for this score either were not found within the given timeframe or did not fit some other criterion.  Computed MELD-Na unavailable. One or more values for this score either were not found within the given timeframe or did not fit some other criterion.       Lab Results   Component Value Date    HGBA1C 5.7 (H) 03/16/2024     No results found for: \"STAPHMRSASCR\"  "

## 2025-01-10 ENCOUNTER — TELEPHONE (OUTPATIENT)
Dept: PRIMARY CARE | Facility: CLINIC | Age: 60
End: 2025-01-10
Payer: COMMERCIAL

## 2025-01-10 DIAGNOSIS — I10 HYPERTENSION, UNSPECIFIED TYPE: ICD-10-CM

## 2025-01-10 DIAGNOSIS — E03.9 HYPOTHYROIDISM, UNSPECIFIED TYPE: ICD-10-CM

## 2025-01-10 DIAGNOSIS — Z00.00 ROUTINE MEDICAL EXAM: ICD-10-CM

## 2025-01-10 DIAGNOSIS — E78.2 MIXED HYPERLIPIDEMIA: ICD-10-CM

## 2025-01-10 DIAGNOSIS — R73.01 IMPAIRED FASTING GLUCOSE: ICD-10-CM

## 2025-03-26 ENCOUNTER — APPOINTMENT (OUTPATIENT)
Dept: ORTHOPEDIC SURGERY | Facility: CLINIC | Age: 60
End: 2025-03-26
Payer: COMMERCIAL

## 2025-03-26 ENCOUNTER — HOSPITAL ENCOUNTER (OUTPATIENT)
Dept: RADIOLOGY | Facility: CLINIC | Age: 60
Discharge: HOME | End: 2025-03-26
Payer: COMMERCIAL

## 2025-03-26 ENCOUNTER — TELEPHONE (OUTPATIENT)
Dept: ORTHOPEDIC SURGERY | Facility: CLINIC | Age: 60
End: 2025-03-26

## 2025-03-26 DIAGNOSIS — M25.552 LEFT HIP PAIN: ICD-10-CM

## 2025-03-26 DIAGNOSIS — M54.32 SCIATICA OF LEFT SIDE: ICD-10-CM

## 2025-03-26 DIAGNOSIS — M17.11 ARTHRITIS OF RIGHT KNEE: Primary | ICD-10-CM

## 2025-03-26 PROCEDURE — 73502 X-RAY EXAM HIP UNI 2-3 VIEWS: CPT | Mod: LT

## 2025-03-26 PROCEDURE — 73502 X-RAY EXAM HIP UNI 2-3 VIEWS: CPT | Mod: LEFT SIDE | Performed by: RADIOLOGY

## 2025-03-26 PROCEDURE — 20610 DRAIN/INJ JOINT/BURSA W/O US: CPT

## 2025-03-26 PROCEDURE — 99214 OFFICE O/P EST MOD 30 MIN: CPT

## 2025-03-26 RX ORDER — METHYLPREDNISOLONE 4 MG/1
4 TABLET ORAL ONCE
Qty: 21 TABLET | Refills: 0 | Status: SHIPPED | OUTPATIENT
Start: 2025-03-26 | End: 2025-03-26

## 2025-03-26 RX ORDER — NAPROXEN 500 MG/1
500 TABLET ORAL
Qty: 60 TABLET | Refills: 1 | Status: SHIPPED | OUTPATIENT
Start: 2025-03-26 | End: 2025-05-25

## 2025-03-26 RX ORDER — TRIAMCINOLONE ACETONIDE 40 MG/ML
1 INJECTION, SUSPENSION INTRA-ARTICULAR; INTRAMUSCULAR
Status: COMPLETED | OUTPATIENT
Start: 2025-03-26 | End: 2025-03-26

## 2025-03-26 RX ADMIN — TRIAMCINOLONE ACETONIDE 1 ML: 40 INJECTION, SUSPENSION INTRA-ARTICULAR; INTRAMUSCULAR at 11:02

## 2025-03-26 ASSESSMENT — PAIN SCALES - GENERAL: PAINLEVEL_OUTOF10: 5 - MODERATE PAIN

## 2025-03-26 ASSESSMENT — PAIN - FUNCTIONAL ASSESSMENT: PAIN_FUNCTIONAL_ASSESSMENT: 0-10

## 2025-03-26 NOTE — TELEPHONE ENCOUNTER
"You prescribed Naproxen for her this morning, before she gets the Naproxen filled she would like to double check that its ok for her to take-she has high blood pressure and \"coronary issues\". She does see a cardiologist if you need her to check with them.   "

## 2025-03-26 NOTE — PROGRESS NOTES
Did shot in right knee, left hip pain, states woke up and felt sor, sleeps on belly and states her bed has a big dip in it like leding to hyperextension of her hips  This is a consultation from Dr. Joy Crespo DO for   Chief Complaint   Patient presents with    Right Knee - Pain    Left Hip - Pain       This is a 59 y.o. female who presents for evaluation of right knee and left hip pain.  Patient states her right knee has had exacerbations of pain in the past few weeks and states she would like another round of cortisone.  She states that she feels like it is helping with her pain and she would like to continue with this therapy for her right knee to try and delay surgery.  Patient states she has been feeling pain in her left lower back that wraps around to her left hip and noticed that after waking up morning.  Patient states that her position of comfort for sleeping is on her belly and her bed has a big depression in the middle of it.  Patient states she has some pain with rising from a seated to standing position and has pain when she is walking to.  Patient states the pain radiates down her leg.  Patient denies fevers or chills.    Physical Exam    There has been no interval change in this patient's past medical, surgical, medications, allergies, family history or social history since the most recent visit to a provider within our department. 14 point review of systems was performed, reviewed, and negative except for pertinent positives documented in the history of present illness.     Constitutional: well developed, well nourished female in no acute distress  Psychiatric: normal mood, appropriate affect  Eyes: sclera anicteric  HENT: normocephalic/atraumatic  CV: regular rate and rhythm   Respiratory: non labored breathing  Integumentary: no rash  Neurological: moves all extremities    Right knee exam: skin intact no lacerations or abrations. no effusion. nttp joint line. negative log roll negative  patellar grind. ROM 0-120. stable to varus and valgus stress at 0 and 30 degrees. negative lachman negative posterior drawer negative geoff. 5/5 ehl/fhl/gs/ta. silt s/s/sp/dp/t. 2+ dp/pt    Left hip exam: skin normal, no abrasions, wounds, or lacerations.  Very mildly tender over greater trochanter. negative log roll, negative armand's test. flexion to 90 degrees without pain. no pain with flexion abduction and external rotation, no pain with flexion adduction and internal rotation. neurovascularly intact distally            Imaging:  Xrays were ordered by me, they were reviewed and independently interpreted by me today, they show left hip with preserved joint space and no presence of acute fracture or dislocation.    L Inj/Asp: R knee on 3/26/2025 11:02 AM  Indications: pain and joint swelling  Details: 22 G needle, anterolateral approach  Medications: 1 mL triamcinolone acetonide 40 mg/mL    Discussion:  I discussed the conservative treatment options for knee osteoarthritis including but not limited to physical therapy, oral NSAIDS, activity and lifestyle modification, and corticosteroid injections. Pt has elected to undergo a cortisone injection today. I have explained the risk and benefits of an injection including the possibility of joint infection, bleeding, damage to cartilage, allergic reaction. Patient verbalized understanding and gave verbal consent wishes to proceed with a intra-articular cortisone injection for their knee.    Procedure:  After discussing the risk and benefits of the procedure, we proceeded with an intra-articular right knee injection. We discussed the risks and benefits and potential morbidity related to the treatment, and to the prescription medication administered in the injection    With the patient's informed verbal consent, the right knee was prepped in standard sterile fashion with Chlorhexidine. The skin was then anesthetized with ethyl chloride spray and cleaned again with  Chlorhexidine. The knee was then apirated/injected with a prefilled 20-gauge syringe of 40 mg Kenalog + 4 ml Lidocaine using the lateral approach without complications.  The patient tolerated this well and felt immediate initial relief of symptoms. A bandaid was applied and the patient ambulated out of the clinic on ther own accord without difficulty. Patient was instructed to avoid physical activity for 24-48 hours to prevent the knees from swelling and may ice the knees as tolerated. Patient should contact the office if any signs of of infection appear: redness, fever, chills, drainage, swelling or warmth to the knees.  Pt understands that the injections can be repeated no sooner than 3 months.    Procedure, treatment alternatives, risks and benefits explained, specific risks discussed. Consent was given by the patient. Immediately prior to procedure a time out was called to verify the correct patient, procedure, equipment, support staff and site/side marked as required. Patient was prepped and draped in the usual sterile fashion.             Impression/Plan: This is a 59 y.o. female with exacerbations of pain in her right knee.  I had an in depth discussion with the patient regarding treatment options for arthritis and their relative risks and benefits. We reviewed surgical and nonsurgical option for treatment. Treatments include anti inflammatory medications, physical therapy, weight loss, activity modification, use of assistive devices, injection therapies. We discussed current prescriptions and risks and benefits of continuation of prescription medication as apporpriate. We discussed that arthritis is often progressive over time, an in end stage arthritis surgical interventions can be considered, including arthroplasty. All questions were answered and the patient voiced their understanding.  We did a right steroid injection today in her right knee.  She is welcome back in 3 months or more for another round.   "We also had an in-depth discussion about her left sided lower back and hip pain.  I told her that it seems to be coming from her lower back and she states that she has had a history of problems with her disks in her lower back.  Told her it seems like she has some sciatica and I prescribed her a Medrol Dosepak to see if this will help with swelling and inflammation.  I also prescribed her some naproxen to help with pain and inflammation of her right knee and potentially could help with her sciatica 2.  I ended up giving her a printout of physical therapy exercises that she can do for sciatica and told her to do these daily to help get rid of the problem.  I offered physical therapy but she stated that it would be too expensive because she has to [employer] everywhere.  I told her to look up videos of physical therapy and do the stretches at home which she states that she does not have Internet computer or phone they could play video.  This is why printed out the information.  Hopefully she will do well reading about the exercises and performing them.    BMI Readings from Last 1 Encounters:   10/03/24 41.77 kg/m²      Lab Results   Component Value Date    CREATININE 0.84 11/12/2024     Tobacco Use: High Risk (3/26/2025)    Patient History     Smoking Tobacco Use: Every Day     Smokeless Tobacco Use: Never     Passive Exposure: Current      Computed MELD 3.0 unavailable. One or more values for this score either were not found within the given timeframe or did not fit some other criterion.  Computed MELD-Na unavailable. One or more values for this score either were not found within the given timeframe or did not fit some other criterion.       Lab Results   Component Value Date    HGBA1C 5.7 (H) 03/16/2024     No results found for: \"STAPHMRSASCR\"  "

## 2025-04-06 LAB
ALBUMIN SERPL-MCNC: 4 G/DL (ref 3.6–5.1)
ALP SERPL-CCNC: 59 U/L (ref 37–153)
ALT SERPL-CCNC: 19 U/L (ref 6–29)
ANION GAP SERPL CALCULATED.4IONS-SCNC: 7 MMOL/L (CALC) (ref 7–17)
AST SERPL-CCNC: 15 U/L (ref 10–35)
BASOPHILS # BLD AUTO: 58 CELLS/UL (ref 0–200)
BASOPHILS NFR BLD AUTO: 0.9 %
BILIRUB SERPL-MCNC: 0.9 MG/DL (ref 0.2–1.2)
BUN SERPL-MCNC: 25 MG/DL (ref 7–25)
CALCIUM SERPL-MCNC: 9.5 MG/DL (ref 8.6–10.4)
CHLORIDE SERPL-SCNC: 104 MMOL/L (ref 98–110)
CHOLEST SERPL-MCNC: 171 MG/DL
CHOLEST/HDLC SERPL: 3.4 (CALC)
CO2 SERPL-SCNC: 31 MMOL/L (ref 20–32)
CREAT SERPL-MCNC: 0.95 MG/DL (ref 0.5–1.05)
EGFRCR SERPLBLD CKD-EPI 2021: 68 ML/MIN/1.73M2
EOSINOPHIL # BLD AUTO: 166 CELLS/UL (ref 15–500)
EOSINOPHIL NFR BLD AUTO: 2.6 %
ERYTHROCYTE [DISTWIDTH] IN BLOOD BY AUTOMATED COUNT: 12.2 % (ref 11–15)
EST. AVERAGE GLUCOSE BLD GHB EST-MCNC: 126 MG/DL
EST. AVERAGE GLUCOSE BLD GHB EST-SCNC: 7 MMOL/L
GLUCOSE SERPL-MCNC: 88 MG/DL (ref 65–99)
HBA1C MFR BLD: 6 % OF TOTAL HGB
HCT VFR BLD AUTO: 44.5 % (ref 35–45)
HDLC SERPL-MCNC: 51 MG/DL
HGB BLD-MCNC: 14.7 G/DL (ref 11.7–15.5)
LDLC SERPL CALC-MCNC: 96 MG/DL (CALC)
LYMPHOCYTES # BLD AUTO: 2605 CELLS/UL (ref 850–3900)
LYMPHOCYTES NFR BLD AUTO: 40.7 %
MCH RBC QN AUTO: 32.2 PG (ref 27–33)
MCHC RBC AUTO-ENTMCNC: 33 G/DL (ref 32–36)
MCV RBC AUTO: 97.6 FL (ref 80–100)
MONOCYTES # BLD AUTO: 704 CELLS/UL (ref 200–950)
MONOCYTES NFR BLD AUTO: 11 %
NEUTROPHILS # BLD AUTO: 2867 CELLS/UL (ref 1500–7800)
NEUTROPHILS NFR BLD AUTO: 44.8 %
NONHDLC SERPL-MCNC: 120 MG/DL (CALC)
PLATELET # BLD AUTO: 249 THOUSAND/UL (ref 140–400)
PMV BLD REES-ECKER: 10.2 FL (ref 7.5–12.5)
POTASSIUM SERPL-SCNC: 4.9 MMOL/L (ref 3.5–5.3)
PROT SERPL-MCNC: 6.3 G/DL (ref 6.1–8.1)
RBC # BLD AUTO: 4.56 MILLION/UL (ref 3.8–5.1)
SODIUM SERPL-SCNC: 142 MMOL/L (ref 135–146)
TRIGL SERPL-MCNC: 138 MG/DL
TSH SERPL-ACNC: 1.59 MIU/L (ref 0.4–4.5)
WBC # BLD AUTO: 6.4 THOUSAND/UL (ref 3.8–10.8)

## 2025-04-10 ENCOUNTER — APPOINTMENT (OUTPATIENT)
Age: 60
End: 2025-04-10
Payer: COMMERCIAL

## 2025-04-10 VITALS
OXYGEN SATURATION: 98 % | RESPIRATION RATE: 18 BRPM | HEIGHT: 65 IN | DIASTOLIC BLOOD PRESSURE: 80 MMHG | BODY MASS INDEX: 42.82 KG/M2 | SYSTOLIC BLOOD PRESSURE: 124 MMHG | WEIGHT: 257 LBS | HEART RATE: 65 BPM

## 2025-04-10 DIAGNOSIS — I10 PRIMARY HYPERTENSION: ICD-10-CM

## 2025-04-10 DIAGNOSIS — I35.0 NONRHEUMATIC AORTIC VALVE STENOSIS: ICD-10-CM

## 2025-04-10 DIAGNOSIS — E78.2 MIXED HYPERLIPIDEMIA: ICD-10-CM

## 2025-04-10 DIAGNOSIS — E78.2 MIXED DYSLIPIDEMIA: Primary | ICD-10-CM

## 2025-04-10 PROCEDURE — 93000 ELECTROCARDIOGRAM COMPLETE: CPT | Performed by: INTERNAL MEDICINE

## 2025-04-10 PROCEDURE — 3079F DIAST BP 80-89 MM HG: CPT | Performed by: INTERNAL MEDICINE

## 2025-04-10 PROCEDURE — 3074F SYST BP LT 130 MM HG: CPT | Performed by: INTERNAL MEDICINE

## 2025-04-10 PROCEDURE — 3008F BODY MASS INDEX DOCD: CPT | Performed by: INTERNAL MEDICINE

## 2025-04-10 PROCEDURE — 99214 OFFICE O/P EST MOD 30 MIN: CPT | Performed by: INTERNAL MEDICINE

## 2025-04-10 RX ORDER — ATORVASTATIN CALCIUM 80 MG/1
80 TABLET, FILM COATED ORAL DAILY
Qty: 90 TABLET | Refills: 3 | Status: SHIPPED | OUTPATIENT
Start: 2025-04-10 | End: 2026-04-10

## 2025-04-10 RX ORDER — METOPROLOL SUCCINATE 50 MG/1
50 TABLET, EXTENDED RELEASE ORAL DAILY
Qty: 90 TABLET | Refills: 3 | Status: SHIPPED | OUTPATIENT
Start: 2025-04-10 | End: 2026-04-10

## 2025-04-10 RX ORDER — EZETIMIBE 10 MG/1
10 TABLET ORAL DAILY
Qty: 90 TABLET | Refills: 3 | Status: SHIPPED | OUTPATIENT
Start: 2025-04-10 | End: 2026-04-10

## 2025-04-10 ASSESSMENT — PAIN SCALES - GENERAL: PAINLEVEL_OUTOF10: 0-NO PAIN

## 2025-04-10 ASSESSMENT — LIFESTYLE VARIABLES
AUDIT TOTAL SCORE: 0
HOW MANY STANDARD DRINKS CONTAINING ALCOHOL DO YOU HAVE ON A TYPICAL DAY: PATIENT DOES NOT DRINK
SKIP TO QUESTIONS 9-10: 1
AUDIT-C TOTAL SCORE: 0
HOW OFTEN DO YOU HAVE A DRINK CONTAINING ALCOHOL: NEVER
HOW OFTEN DO YOU HAVE SIX OR MORE DRINKS ON ONE OCCASION: NEVER
HAVE YOU OR SOMEONE ELSE BEEN INJURED AS A RESULT OF YOUR DRINKING: NO
HAS A RELATIVE, FRIEND, DOCTOR, OR ANOTHER HEALTH PROFESSIONAL EXPRESSED CONCERN ABOUT YOUR DRINKING OR SUGGESTED YOU CUT DOWN: NO

## 2025-04-10 ASSESSMENT — ENCOUNTER SYMPTOMS
LOSS OF SENSATION IN FEET: 0
OCCASIONAL FEELINGS OF UNSTEADINESS: 0
DEPRESSION: 0

## 2025-04-10 NOTE — PROGRESS NOTES
History of present illness:  This is a very pleasant 59-year-old female with history of mild aortic valve stenosis, hypertension, obesity, hyperlipidemia.  .  She had a cardiac catheterization 2017 that showed ectatic arteries and nonobstructive coronary artery disease.  Patient has also mild  aortic valve stenosis with aortic valve area of of 1.8 cm² back in November 2022.  Comes to my office to establish care.  Denies having any chest pain.  She has chronic shortness of breath with mild to moderate activity related to COPD smoking and obesity.  Patient denies having dizziness lightheadedness or syncope.  Patient has been complaining of cramps in her calves at sleep happening maybe once every 2 to 3 weeks.      History reviewed. No pertinent past medical history.    Past Surgical History:   Procedure Laterality Date    COLONOSCOPY  06/02/2020    US GUIDED THYROID BIOPSY  04/20/2015    US GUIDED THYROID BIOPSY [facility]       Allergies   Allergen Reactions    Penicillin Unknown        reports that she has been smoking cigarettes. She started smoking about 41 years ago. She has a 41.3 pack-year smoking history. She has been exposed to tobacco smoke. She has never used smokeless tobacco. She reports that she does not currently use alcohol. She reports that she does not use drugs.    No family history on file.    Patient's Medications   New Prescriptions    No medications on file   Previous Medications    ALBUTEROL (PROAIR HFA) 90 MCG/ACTUATION INHALER    Inhale 2 puffs every 6 hours.    ATORVASTATIN (LIPITOR) 80 MG TABLET    Take 1 tablet (80 mg) by mouth once daily.    BIFIDOBACTERIUM INFANTIS (ALIGN) 4 MG CAPSULE    as directed Orally    BUDESONIDE-FORMOTEROL (SYMBICORT) 160-4.5 MCG/ACTUATION INHALER    Inhale 2 puffs 2 times a day. Rinse mouth with water after use to reduce aftertaste and incidence of candidiasis. Do not swallow.    CALCIUM CARBONATE-VITAMIN D3 (CALCIUM 600 + D,3,) 600 MG-5 MCG (200 UNIT)  CAPSULE    Take 1 tablet by mouth once every 24 hours. With a meal    LEVOTHYROXINE (SYNTHROID, LEVOXYL) 112 MCG TABLET    TAKE 1 TABLET BY MOUTH ONCE DAILY.    METOPROLOL SUCCINATE XL (TOPROL-XL) 50 MG 24 HR TABLET    Take 1 tablet (50 mg) by mouth once daily.    MULTIVITAMIN ORAL    Take 1 tablet by mouth once daily.    NAPROXEN (NAPROSYN) 500 MG TABLET    Take 1 tablet (500 mg) by mouth 2 times daily (morning and late afternoon). Primary Care Provider to refill.    PANTOPRAZOLE (PROTONIX) 40 MG EC TABLET    Take 1 tablet (40 mg) by mouth once daily.    PSYLLIUM (METAMUCIL) 0.4 GRAM CAPSULE    Take 2,000 mg by mouth.    SUCRALFATE (CARAFATE) 1 GRAM TABLET    Take 1 tablet (1 g) by mouth once daily.    TRAMADOL (ULTRAM) 50 MG TABLET    Take 1 tablet (50 mg) by mouth every 8 hours if needed for moderate pain (4 - 6). not relieved by Ibuprofen or Acetominophen   Modified Medications    No medications on file   Discontinued Medications    No medications on file       Objective   Physical Exam  General: Patient in no acute distress   HEENT: Atraumatic normocephalic.  Neck: Supple, jugular venous pressure within normal limit.  No bruits  Lungs: Clear to auscultation bilaterally  Cardiovascular: Regular rate and rhythm, normal heart sounds, no murmurs rubs or gallops  Abdomen: Soft nontender nondistended.  Normal bowel sounds.  Extremities: Warm to touch, no edema.    Lab Review   Orders Only on 04/05/2025   Component Date Value    CHOLESTEROL, TOTAL 04/05/2025 171     HDL CHOLESTEROL 04/05/2025 51     TRIGLYCERIDES 04/05/2025 138     LDL-CHOLESTEROL 04/05/2025 96     CHOL/HDLC RATIO 04/05/2025 3.4     NON HDL CHOLESTEROL 04/05/2025 120     GLUCOSE 04/05/2025 88     UREA NITROGEN (BUN) 04/05/2025 25     CREATININE 04/05/2025 0.95     EGFR 04/05/2025 68     SODIUM 04/05/2025 142     POTASSIUM 04/05/2025 4.9     CHLORIDE 04/05/2025 104     CARBON DIOXIDE 04/05/2025 31     ELECTROLYTE BALANCE 04/05/2025 7     CALCIUM  04/05/2025 9.5     PROTEIN, TOTAL 04/05/2025 6.3     ALBUMIN 04/05/2025 4.0     BILIRUBIN, TOTAL 04/05/2025 0.9     ALKALINE PHOSPHATASE 04/05/2025 59     AST 04/05/2025 15     ALT 04/05/2025 19     WHITE BLOOD CELL COUNT 04/05/2025 6.4     RED BLOOD CELL COUNT 04/05/2025 4.56     HEMOGLOBIN 04/05/2025 14.7     HEMATOCRIT 04/05/2025 44.5     MCV 04/05/2025 97.6     MCH 04/05/2025 32.2     MCHC 04/05/2025 33.0     RDW 04/05/2025 12.2     PLATELET COUNT 04/05/2025 249     MPV 04/05/2025 10.2     ABSOLUTE NEUTROPHILS 04/05/2025 2,867     ABSOLUTE LYMPHOCYTES 04/05/2025 2,605     ABSOLUTE MONOCYTES 04/05/2025 704     ABSOLUTE EOSINOPHILS 04/05/2025 166     ABSOLUTE BASOPHILS 04/05/2025 58     NEUTROPHILS 04/05/2025 44.8     LYMPHOCYTES 04/05/2025 40.7     MONOCYTES 04/05/2025 11.0     EOSINOPHILS 04/05/2025 2.6     BASOPHILS 04/05/2025 0.9     TSH W/REFLEX TO FT4 04/05/2025 1.59     HEMOGLOBIN A1c 04/05/2025 6.0 (H)     eAG (mg/dL) 04/05/2025 126     eAG (mmol/L) 04/05/2025 7.0         Assessment/Plan   Patient Active Problem List   Diagnosis    Abnormal finding in urine    Arthritis of knee, right    Arthritis of wrist, right    Asthma with COPD (Multi)    Chest wall pain    DDD (degenerative disc disease), lumbar    Disorder of right eustachian tube    Diverticulitis of large intestine without bleeding    Diverticulosis of colon    Dysuria    Hair loss    History of deep venous thrombosis    Hordeolum externum (stye)    Hypertension    Hypothyroidism    Impaired fasting glucose    Impetigo    Metatarsalgia of left foot    Mixed dyslipidemia    Mixed hyperlipidemia    Myalgia    Inflammatory arthritis    Osteoarthritis    Plantar fasciitis of left foot    Achilles tendonitis    Right carpal tunnel syndrome    Sciatica, left side    Skin tag    Chronic abdominal pain    Superficial postoperative wound infection    Nonrheumatic aortic valve stenosis      This is a very pleasant 59-year-old female with history of mild  aortic valve stenosis, hypertension, obesity, hyperlipidemia.  .  She had a cardiac catheterization 2017 that showed ectatic arteries and nonobstructive coronary artery disease.  Patient has also mild  aortic valve stenosis with aortic valve area of of 1.8 cm² back in November 2022.  Comes to my office to establish care.  Denies having any chest pain.  She has chronic shortness of breath with mild to moderate activity related to COPD smoking and obesity.  Patient denies having dizziness lightheadedness or syncope.  Patient has been complaining of cramps in her calves at sleep happening maybe once every 2 to 3 weeks.      Patient stable cardiac wise.  Blood pressure and heart rate well-controlled.  LDL elevated.  We will start her on ezetimibe 10 mg oral daily.  Discussed with her weight loss and lifestyle modification.  Her EKG today showing normal sinus rhythm nonspecific ST-T wave abnormalities.  Recommend her to stretch her legs before sleeping and as well as recommended some magnesium over-the-counter.  We will follow-up in 6 months.    Jodie Abdalla MD

## 2025-04-15 ENCOUNTER — APPOINTMENT (OUTPATIENT)
Dept: PRIMARY CARE | Facility: CLINIC | Age: 60
End: 2025-04-15
Payer: COMMERCIAL

## 2025-04-15 ENCOUNTER — TELEPHONE (OUTPATIENT)
Dept: RADIOLOGY | Facility: HOSPITAL | Age: 60
End: 2025-04-15

## 2025-04-15 VITALS
WEIGHT: 256 LBS | SYSTOLIC BLOOD PRESSURE: 123 MMHG | OXYGEN SATURATION: 96 % | DIASTOLIC BLOOD PRESSURE: 69 MMHG | BODY MASS INDEX: 42.65 KG/M2 | HEIGHT: 65 IN | HEART RATE: 72 BPM

## 2025-04-15 DIAGNOSIS — R07.0 THROAT PAIN: ICD-10-CM

## 2025-04-15 DIAGNOSIS — J44.89 ASTHMA WITH COPD (MULTI): ICD-10-CM

## 2025-04-15 DIAGNOSIS — E78.2 MIXED DYSLIPIDEMIA: ICD-10-CM

## 2025-04-15 DIAGNOSIS — R73.03 PREDIABETES: ICD-10-CM

## 2025-04-15 DIAGNOSIS — Z00.00 ANNUAL PHYSICAL EXAM: Primary | ICD-10-CM

## 2025-04-15 DIAGNOSIS — E89.0 POSTOPERATIVE HYPOTHYROIDISM: ICD-10-CM

## 2025-04-15 DIAGNOSIS — Z72.0 TOBACCO USE: ICD-10-CM

## 2025-04-15 DIAGNOSIS — R25.2 BILATERAL LEG CRAMPS: ICD-10-CM

## 2025-04-15 DIAGNOSIS — I10 PRIMARY HYPERTENSION: ICD-10-CM

## 2025-04-15 PROBLEM — E78.6 HDL DEFICIENCY: Status: ACTIVE | Noted: 2025-04-15

## 2025-04-15 PROBLEM — K57.32 DIVERTICULITIS OF LARGE INTESTINE WITHOUT BLEEDING: Status: RESOLVED | Noted: 2023-10-14 | Resolved: 2025-04-15

## 2025-04-15 PROBLEM — R30.0 DYSURIA: Status: RESOLVED | Noted: 2019-01-24 | Resolved: 2025-04-15

## 2025-04-15 PROBLEM — H00.019 HORDEOLUM EXTERNUM (STYE): Status: RESOLVED | Noted: 2019-01-24 | Resolved: 2025-04-15

## 2025-04-15 PROBLEM — L01.00 IMPETIGO: Status: RESOLVED | Noted: 2023-10-14 | Resolved: 2025-04-15

## 2025-04-15 PROBLEM — R82.90 ABNORMAL FINDING IN URINE: Status: RESOLVED | Noted: 2019-01-03 | Resolved: 2025-04-15

## 2025-04-15 PROBLEM — H69.91 DISORDER OF RIGHT EUSTACHIAN TUBE: Status: RESOLVED | Noted: 2018-08-09 | Resolved: 2025-04-15

## 2025-04-15 PROBLEM — T81.49XA SUPERFICIAL POSTOPERATIVE WOUND INFECTION: Status: RESOLVED | Noted: 2023-10-14 | Resolved: 2025-04-15

## 2025-04-15 PROBLEM — E78.6 HDL DEFICIENCY: Status: ACTIVE | Noted: 2018-11-12

## 2025-04-15 PROCEDURE — 3074F SYST BP LT 130 MM HG: CPT | Performed by: FAMILY MEDICINE

## 2025-04-15 PROCEDURE — G8433 SCR FOR DEP NOT CPT DOC RSN: HCPCS | Performed by: FAMILY MEDICINE

## 2025-04-15 PROCEDURE — 3008F BODY MASS INDEX DOCD: CPT | Performed by: FAMILY MEDICINE

## 2025-04-15 PROCEDURE — 99214 OFFICE O/P EST MOD 30 MIN: CPT | Performed by: FAMILY MEDICINE

## 2025-04-15 PROCEDURE — 3078F DIAST BP <80 MM HG: CPT | Performed by: FAMILY MEDICINE

## 2025-04-15 PROCEDURE — 99396 PREV VISIT EST AGE 40-64: CPT | Performed by: FAMILY MEDICINE

## 2025-04-15 RX ORDER — LEVOTHYROXINE SODIUM 112 UG/1
112 TABLET ORAL DAILY
Qty: 90 TABLET | Refills: 3 | Status: SHIPPED | OUTPATIENT
Start: 2025-04-15 | End: 2026-04-15

## 2025-04-15 ASSESSMENT — ENCOUNTER SYMPTOMS
EYES NEGATIVE: 1
RESPIRATORY NEGATIVE: 1
NEUROLOGICAL NEGATIVE: 1
PSYCHIATRIC NEGATIVE: 1
CONSTITUTIONAL NEGATIVE: 1
MUSCULOSKELETAL NEGATIVE: 1
CARDIOVASCULAR NEGATIVE: 1
GASTROINTESTINAL NEGATIVE: 1
SORE THROAT: 1
HEMATOLOGIC/LYMPHATIC NEGATIVE: 1
ALLERGIC/IMMUNOLOGIC NEGATIVE: 1

## 2025-04-15 ASSESSMENT — PATIENT HEALTH QUESTIONNAIRE - PHQ9
2. FEELING DOWN, DEPRESSED OR HOPELESS: NOT AT ALL
SUM OF ALL RESPONSES TO PHQ9 QUESTIONS 1 AND 2: 0
1. LITTLE INTEREST OR PLEASURE IN DOING THINGS: NOT AT ALL

## 2025-04-15 ASSESSMENT — COLUMBIA-SUICIDE SEVERITY RATING SCALE - C-SSRS
2. HAVE YOU ACTUALLY HAD ANY THOUGHTS OF KILLING YOURSELF?: NO
6. HAVE YOU EVER DONE ANYTHING, STARTED TO DO ANYTHING, OR PREPARED TO DO ANYTHING TO END YOUR LIFE?: NO
1. IN THE PAST MONTH, HAVE YOU WISHED YOU WERE DEAD OR WISHED YOU COULD GO TO SLEEP AND NOT WAKE UP?: NO

## 2025-04-15 NOTE — PROGRESS NOTES
Subjective   Patient ID: Izzy Cantor is a 59 y.o. female who presents for Annual Exam (Pt sees cardiologist pt did labs/Pt keep getting muscle cramps in both cafes  /Pt has sore throat on and off).    The patient is transferring her care from [facility] who has left the practice.  She is here for a CPE.  The patient is due for tetanus and shingles vaccinations.  She declines both of these medications.  Cervical and breast cancer screenings are UTD.  She is under the care of gynecology (was Dr. Ball and will be switching to new provider in the same practice).  Colon cancer screening is UTD.  EKG is UTD.  Fasting labs were completed recently.    1) HTN/Mixed dyslipidemia: controlled/controlled, compliant with metoprolol, atorvastatin, and ezetimibe as directed, under the care of cardiology (Dr. Abdalla), does not follow a low salt or low fat diet, walks dog 7 days a week, seen every 6 months.  2) Prediabetes: stable, HbA1C 6.0%, no medication taken, does not follow a low carb diet, exercises as stated above.    3) Hypothyroidism: controlled, compliant with levothyroxine.  4) Asthma with COPD: controlled, compliant with Symbicort and albuterol inhaler, under the care of PCP.  5) Tobacco use: current, due for repeat LDCT, will order.  6) Bilateral leg cramps: began one year ago, cardiologist recommended taking magnesium which she has not started taking.    7) Intermittent sore throat: began a few weeks ago, no other URI symptoms, today no pain.      Review of Systems   Constitutional: Negative.    HENT:  Positive for sore throat (intermittent).    Eyes: Negative.    Respiratory: Negative.     Cardiovascular: Negative.    Gastrointestinal: Negative.    Genitourinary: Negative.    Musculoskeletal: Negative.         Positive for bilateral leg cramps   Skin: Negative.    Allergic/Immunologic: Negative.    Neurological: Negative.    Hematological: Negative.    Psychiatric/Behavioral: Negative.       Objective   /69 (BP  "Location: Left arm, Patient Position: Sitting, BP Cuff Size: Adult)   Pulse 72   Ht 1.651 m (5' 5\")   Wt 116 kg (256 lb)   LMP 05/01/2016 (Approximate)   SpO2 96%   BMI 42.60 kg/m²     Physical Exam  Vitals and nursing note reviewed.   Constitutional:       General: She is not in acute distress.     Appearance: Normal appearance. She is obese. She is not ill-appearing.      Comments: Hirsutism   HENT:      Head: Normocephalic and atraumatic.      Right Ear: Tympanic membrane, ear canal and external ear normal.      Left Ear: Tympanic membrane, ear canal and external ear normal.      Nose: Nose normal.      Mouth/Throat:      Mouth: Mucous membranes are moist.      Pharynx: Oropharynx is clear.   Eyes:      Extraocular Movements: Extraocular movements intact.      Conjunctiva/sclera: Conjunctivae normal.      Pupils: Pupils are equal, round, and reactive to light.   Neck:      Vascular: No carotid bruit.      Comments: Surgical scar on the anterior neck.  Cardiovascular:      Rate and Rhythm: Normal rate and regular rhythm.      Pulses: Normal pulses.      Heart sounds: Normal heart sounds.   Pulmonary:      Effort: Pulmonary effort is normal.      Breath sounds: Normal breath sounds.   Abdominal:      General: Bowel sounds are normal.      Palpations: Abdomen is soft.   Musculoskeletal:         General: Normal range of motion.      Cervical back: Normal range of motion and neck supple.      Comments: 5/5 muscle strength x 4 extremities   Lymphadenopathy:      Cervical: No cervical adenopathy.   Skin:     General: Skin is warm and dry.      Capillary Refill: Capillary refill takes less than 2 seconds.   Neurological:      General: No focal deficit present.      Mental Status: She is alert and oriented to person, place, and time.   Psychiatric:         Mood and Affect: Mood normal.         Behavior: Behavior normal.     Assessment/Plan   Diagnoses and all orders for this visit:  Annual physical exam  PE " completed.  Patient declines tetanus and shingles vaccinations.  Cervical and breast cancer screenings UTD.  Keep follow-up appointments with gyn.  Await input.  Colon cancer screening UTD.  EKG UTD.  Fasting labs completed recently.   Repeat exam in 1 year.   Primary hypertension  Controlled  Continue with metoprolol as directed.  Keep follow-up appointments with cardiology.  Await input.  Low salt diet.  Regular exercise.  Manage weight.  Follow up in 1 year.   Mixed dyslipidemia  Controlled  Continue with atorvastatin and ezetimibe.  Keep follow-up appointments with cardiology.  Await input.  Low fat diet.  Regular exercise.  Manage weight.  Follow up in 1 year.   Prediabetes  Stable  HbA1C 6.0%.  No medication taken.  Low carb diet.  Regular exercise.  Manage weight.  Follow up in 1 year.   Postoperative hypothyroidism  Controlled  Levothyroxine (Synthroid, Levoxyl) 112 mcg tablet refilled.  Follow up in 1 year.   Asthma with COPD (Multi)  Controlled  Continue with Symbicort and albuterol inhaler as directed.   Follow up in 1 year.   Tobacco use  Current  CT lung screening low dose ordered.  Will notify with results.  Follow up in 1 year.   Bilateral leg cramps  Chronic  Try magnesium as directed by cardiologist.    If not better, may try tonic water with quinine.  Follow up as needed.  Throat pain  Acute and intermittent  Normal exam.  Follow up as needed.    Other orders  -     Follow Up In Primary Care - Health Maintenance; Future

## 2025-04-15 NOTE — TELEPHONE ENCOUNTER
Call placed to the patient in an effort to schedule her for her annual lung cancer screening exam. Spoke with the patient and per the patient she is requesting a weekend appointment. Patient scheduled for a Sunday. Appointment date, time and location verified with the patient. All questions answered.

## 2025-04-24 ENCOUNTER — TELEPHONE (OUTPATIENT)
Dept: ORTHOPEDIC SURGERY | Facility: CLINIC | Age: 60
End: 2025-04-24
Payer: COMMERCIAL

## 2025-04-24 NOTE — TELEPHONE ENCOUNTER
Patient called and stated that she is in a lot of pain at night and she would like to know if dr rojas can prescribe any pain meds , she also had additional questions about her tib fib and what she can do about the pain.

## 2025-04-25 DIAGNOSIS — M17.11 ARTHRITIS OF RIGHT KNEE: Primary | ICD-10-CM

## 2025-04-25 RX ORDER — METHYLPREDNISOLONE 4 MG/1
TABLET ORAL
Qty: 21 TABLET | Refills: 0 | Status: SHIPPED | OUTPATIENT
Start: 2025-04-25

## 2025-04-25 RX ORDER — NAPROXEN 500 MG/1
500 TABLET ORAL
Qty: 60 TABLET | Refills: 1 | Status: SHIPPED | OUTPATIENT
Start: 2025-04-25 | End: 2025-06-24

## 2025-04-29 ENCOUNTER — APPOINTMENT (OUTPATIENT)
Dept: RADIOLOGY | Facility: HOSPITAL | Age: 60
End: 2025-04-29
Payer: COMMERCIAL

## 2025-04-29 ENCOUNTER — HOSPITAL ENCOUNTER (EMERGENCY)
Facility: HOSPITAL | Age: 60
Discharge: LEFT BEFORE DISCHARGE | End: 2025-04-29
Payer: COMMERCIAL

## 2025-04-29 VITALS
DIASTOLIC BLOOD PRESSURE: 80 MMHG | BODY MASS INDEX: 42.93 KG/M2 | TEMPERATURE: 98.2 F | WEIGHT: 258 LBS | HEART RATE: 96 BPM | SYSTOLIC BLOOD PRESSURE: 145 MMHG | OXYGEN SATURATION: 99 % | RESPIRATION RATE: 18 BRPM
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
## 2025-04-29 PROCEDURE — 90471 IMMUNIZATION ADMIN: CPT | Performed by: PHYSICIAN ASSISTANT

## 2025-04-29 PROCEDURE — 2500000004 HC RX 250 GENERAL PHARMACY W/ HCPCS (ALT 636 FOR OP/ED): Mod: JZ | Performed by: PHYSICIAN ASSISTANT

## 2025-04-29 PROCEDURE — 99283 EMERGENCY DEPT VISIT LOW MDM: CPT | Mod: 25

## 2025-04-29 PROCEDURE — 90715 TDAP VACCINE 7 YRS/> IM: CPT | Mod: JZ | Performed by: PHYSICIAN ASSISTANT

## 2025-04-29 RX ORDER — CLINDAMYCIN HYDROCHLORIDE 150 MG/1
450 CAPSULE ORAL 3 TIMES DAILY
Qty: 63 CAPSULE | Refills: 0 | Status: SHIPPED | OUTPATIENT
Start: 2025-04-29 | End: 2025-05-06

## 2025-04-29 RX ADMIN — TETANUS TOXOID, REDUCED DIPHTHERIA TOXOID AND ACELLULAR PERTUSSIS VACCINE, ADSORBED 0.5 ML: 5; 2.5; 8; 8; 2.5 SUSPENSION INTRAMUSCULAR at 11:44

## 2025-04-29 ASSESSMENT — PAIN SCALES - GENERAL
PAINLEVEL_OUTOF10: 3
PAINLEVEL_OUTOF10: 4

## 2025-04-29 ASSESSMENT — COLUMBIA-SUICIDE SEVERITY RATING SCALE - C-SSRS
6. HAVE YOU EVER DONE ANYTHING, STARTED TO DO ANYTHING, OR PREPARED TO DO ANYTHING TO END YOUR LIFE?: NO
2. HAVE YOU ACTUALLY HAD ANY THOUGHTS OF KILLING YOURSELF?: NO
1. IN THE PAST MONTH, HAVE YOU WISHED YOU WERE DEAD OR WISHED YOU COULD GO TO SLEEP AND NOT WAKE UP?: NO

## 2025-04-29 ASSESSMENT — PAIN - FUNCTIONAL ASSESSMENT
PAIN_FUNCTIONAL_ASSESSMENT: 0-10
PAIN_FUNCTIONAL_ASSESSMENT: 0-10

## 2025-04-29 ASSESSMENT — PAIN DESCRIPTION - PAIN TYPE: TYPE: ACUTE PAIN

## 2025-04-29 NOTE — DISCHARGE INSTRUCTIONS
You signed out AGAINST MEDICAL ADVICE, he was not recommend that you leave, you did not stay to complete treatment.  If you change your mind or develop new different worsening pains or symptoms you may return to the ER.

## 2025-04-29 NOTE — ED PROVIDER NOTES
HPI   Chief Complaint   Patient presents with    Animal Bite     Both bilateral UE. Bleeding controlled        HPI  The patient 59-year-old female arrives for dog bite bilateral upper extremities.  Apparently there was a neighbors dog.  Not apparently concerned about rabies, unsure on tetanus no other area of pain or injury other than as noted      Patient History   Medical History[1]  Surgical History[2]  Family History[3]  Social History[4]    Physical Exam   ED Triage Vitals [04/29/25 1136]   Temperature Heart Rate Respirations BP   36.8 °C (98.2 °F) 96 18 145/80      Pulse Ox Temp Source Heart Rate Source Patient Position   99 % Oral Monitor --      BP Location FiO2 (%)     -- --       Physical Exam  GENERAL APPEARANCE: This patient is in no acute respiratory distress. Awake and alert.talking appropriately. Answering questions appropriately. No evidence of pressured speech     VITAL SIGNS: As per the nurses' triage record.     HEENT: Normocephalic, atraumatic.     NECK:  full gross ROM during exam    MUSCULOSKELETAL:  Full gross active range of motion. Ambulating on own with no acute difficulties     NEUROLOGICAL: Awake, alert and oriented x 3.    IMMUNOLOGICAL: No palpable lymphadenopathy or lymphatic streaking noted on visible skin.    DERM: Multiple puncture wounds and small skin tears to the bilateral forearms.  Distal sensation motor function radial pulses intact.    PSYCH: mood and affect appear normal.      ED Course & MDM   ED Course as of 04/29/25 1736   Tue Apr 29, 2025   1159 Patient expressed interest in leaving.  Wanted to talk to her about completing her diagnostics and tests here.  And mid conversation her phone rang and felt that that was more of a priority, she started talking to somebody about the incident and I indicated that I want to keep talking to her however she would not get off of her phone. [AP]      ED Course User Index  [AP] Dominic Valente PA-C         Diagnoses as of 04/29/25  1736   Dog bite, initial encounter                 No data recorded     Joseph Coma Scale Score: 15 (04/29/25 1132 : Shiela Teixeira RN)                           Medical Decision Making  Parts of this chart have been completed using voice recognition software. Please excuse any errors of transcription.  My thought process and reason for plan has been formulated from the time that I saw the patient until the time of disposition and is not specific to one specific moment during their visit and furthermore my MDM encompasses this entire chart and not only this text box.      HPI: Detailed above.    Exam: A medically appropriate exam performed, outlined above, given the known history and presentation.    History Limited by: Nothing    History obtained from: The patient    External/internal records reviewed: No external record reviewed    Social Determinants of Health considered during this visit: Lives at home    Chronic conditions impacting care: Denies    Medications given during visit:  Medications   diphth,pertus(acell),tetanus (BoostRIX) 2.5-8-5 Lf-mcg-Lf/0.5mL vaccine 0.5 mL (0.5 mL intramuscular Given 4/29/25 1144)        Diagnostic/tests  Labs Reviewed - No data to display   No orders to display          Considerations/further MDM:  Considered fracture, considered retained foreign object, considered cellulitis or wound care.  The patient does not appear to be in acute distress.    However the patient was present in the emergency department for only around 25 to 30 minutes when she suddenly became somewhat aggressive about the concept of leaving.  She states that she went today for imaging and completion of ER visit.  And although while, while I was talking to her about risks and benefits her phone started ringing, the nursing staff was actively wrapping and dressing her wounds she briskly moved her arms, dressing for the wounds ended up falling onto the floor, she stated that she needed to take her phone call,  upon taking the phone call she started complaining to somebody on the phone about the dog and started explaining what happened, I tried to indicate that I was still talking to her about risks versus benefits and she would not talk to me or discontinue her phone call.  And then proceeded to tell nursing that she needed to leave.    Patient left AGAINST MEDICAL ADVICE      Procedure  Procedures         [1] History reviewed. No pertinent past medical history.  [2]   Past Surgical History:  Procedure Laterality Date    COLONOSCOPY  06/02/2020    US GUIDED THYROID BIOPSY  04/20/2015    US GUIDED THYROID BIOPSY [facility]   [3] No family history on file.  [4]   Social History  Tobacco Use    Smoking status: Every Day     Current packs/day: 1.00     Average packs/day: 1 pack/day for 41.3 years (41.3 ttl pk-yrs)     Types: Cigarettes     Start date: 1984     Passive exposure: Current    Smokeless tobacco: Never   Vaping Use    Vaping status: Never Used   Substance Use Topics    Alcohol use: Not Currently    Drug use: Never        Dominic Valente PA-C  04/29/25 1739

## 2025-05-01 ENCOUNTER — HOSPITAL ENCOUNTER (EMERGENCY)
Facility: HOSPITAL | Age: 60
Discharge: HOME | End: 2025-05-01
Attending: EMERGENCY MEDICINE
Payer: COMMERCIAL

## 2025-05-01 VITALS
RESPIRATION RATE: 18 BRPM | HEIGHT: 65 IN | TEMPERATURE: 97.3 F | SYSTOLIC BLOOD PRESSURE: 95 MMHG | DIASTOLIC BLOOD PRESSURE: 70 MMHG | BODY MASS INDEX: 42.17 KG/M2 | WEIGHT: 253.09 LBS | HEART RATE: 72 BPM | OXYGEN SATURATION: 97 %
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
## 2025-05-01 DIAGNOSIS — Z71.85 IMMUNIZATION COUNSELING: ICD-10-CM

## 2025-05-01 PROCEDURE — 90675 RABIES VACCINE IM: CPT | Mod: JZ | Performed by: EMERGENCY MEDICINE

## 2025-05-01 PROCEDURE — 2500000004 HC RX 250 GENERAL PHARMACY W/ HCPCS (ALT 636 FOR OP/ED): Mod: JZ | Performed by: EMERGENCY MEDICINE

## 2025-05-01 PROCEDURE — 90471 IMMUNIZATION ADMIN: CPT | Performed by: EMERGENCY MEDICINE

## 2025-05-01 PROCEDURE — 99282 EMERGENCY DEPT VISIT SF MDM: CPT | Performed by: EMERGENCY MEDICINE

## 2025-05-01 RX ADMIN — RABIES VACCINE 1 ML: KIT at 09:47

## 2025-05-01 ASSESSMENT — PAIN SCALES - GENERAL: PAINLEVEL_OUTOF10: 5 - MODERATE PAIN

## 2025-05-01 ASSESSMENT — PAIN - FUNCTIONAL ASSESSMENT: PAIN_FUNCTIONAL_ASSESSMENT: 0-10

## 2025-05-01 NOTE — ED PROVIDER NOTES
Emergency Department Provider Note       History of Present Illness     History provided by:   Limitations to History:   External Records Reviewed with Brief Summary:     HPI:      Physical Exam   Triage vitals:  T 36.3 °C (97.3 °F)  HR 72  BP 95/70  RR 18  O2 97 % None (Room air)          Medical Decision Making & ED Course   Medical Decision Making:    The patient is a 59-year-old female presenting to the emergency department to request that we start rabies vaccination series on her.  The patient states that she was bitten by her neighbors dog several days ago and decided that she did not want the rabies series at that time.  She states that she changed her mind afterward and decided that she does not want to start the rabies series.  She is asking that we give her a rabies shot today.  She states that she was bitten on the left arm.  She does not have any weeping or discharge from her wounds.  She states that she does not have any significant pain at this time.  She states that she does not have any other injury or trauma.  She did have wound care at the time of her last emergency room visit and did receive a tetanus at that time.  She denies any headache or visual change.  No chest pain or shortness of breath.  No abdominal pain.  No nausea or vomiting.  No diarrhea or constipation.  No urinary complaints.  No fever or chills.  No cough or congestion.  No focal weakness or numbness.  All pertinent positives and negatives are recorded above.  All other systems reviewed and otherwise negative.  Vital signs within normal limits.  Physical exam with a well-nourished well-developed female in no acute distress.  HEENT exam within normal limits.  She has no evidence of airway compromise or respiratory distress.  Abdominal exam is benign.  She does not have any gross motor, neurologic or vascular deficits on exam.  Pulses are equal bilaterally.  She does have some puncture wounds on the left forearm they do appear  to be healing without complication.  There is no surrounding erythema, warmth or edema.  No induration.      A rabies vaccination was administered by the nursing staff at the patient's request.  She was given further resources from the nursing staff about animal bites and rabies prophylaxis.      The patient was released in good condition.  She will follow-up with her primary care physician within 1 to 2 days for further management of her current symptoms and a wound check.  She will return to the emergency department sooner with worsening of symptoms or onset of new symptoms.      Impression/diagnosis  Encounter for rabies vaccine  Dog bite to the left arm, subsequent encounter    Critical care time billing is not warranted at this time      There is no indication for emergent imaging and/or diagnostic testing at this time  ----      Differential diagnoses considered include but are not limited to:     Social Determinants of Health which Significantly Impact Care:     EKG Independent Interpretation:     Independent Result Review and Interpretation:     Chronic conditions affecting the patient's care:     The patient was discussed with the following consultants/services:     Care Considerations:     ED Course:  Diagnoses as of 05/01/25 1433   Dog bite, subsequent encounter   Immunization counseling       Disposition   As a result of the work-up, the patient was discharged home.  she was informed of her diagnosis and instructed to come back with any concerns or worsening of condition.  she and was agreeable to the plan as discussed above.  she was given the opportunity to ask questions.  All of the patient's questions were answered.    Procedures   Procedures    Patient seen and discussed with ED attending physician.    Ale Carpenter MD  Emergency Medicine                                                            Ale Carpenter MD  05/01/25 0927       Ale Carpenter MD  05/01/25 9467

## 2025-05-01 NOTE — Clinical Note
Izzy Cantor was seen and treated in our emergency department on 5/1/2025.  She may return to work on 05/01/2025.       If you have any questions or concerns, please don't hesitate to call.      Ale Carpenter MD

## 2025-05-04 ENCOUNTER — HOSPITAL ENCOUNTER (OUTPATIENT)
Dept: RADIOLOGY | Facility: HOSPITAL | Age: 60
Discharge: HOME | End: 2025-05-04
Payer: COMMERCIAL

## 2025-05-04 ENCOUNTER — HOSPITAL ENCOUNTER (EMERGENCY)
Facility: HOSPITAL | Age: 60
Discharge: HOME | End: 2025-05-04
Attending: EMERGENCY MEDICINE
Payer: COMMERCIAL

## 2025-05-04 VITALS
WEIGHT: 255.95 LBS | SYSTOLIC BLOOD PRESSURE: 152 MMHG | HEART RATE: 68 BPM | DIASTOLIC BLOOD PRESSURE: 84 MMHG | HEIGHT: 65 IN | RESPIRATION RATE: 18 BRPM | BODY MASS INDEX: 42.64 KG/M2 | TEMPERATURE: 98.1 F

## 2025-05-04 DIAGNOSIS — Z72.0 TOBACCO USE: ICD-10-CM

## 2025-05-04 DIAGNOSIS — Z71.85 IMMUNIZATION COUNSELING: ICD-10-CM
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
## 2025-05-04 PROCEDURE — 71271 CT THORAX LUNG CANCER SCR C-: CPT

## 2025-05-04 PROCEDURE — 2500000004 HC RX 250 GENERAL PHARMACY W/ HCPCS (ALT 636 FOR OP/ED): Mod: JZ | Performed by: EMERGENCY MEDICINE

## 2025-05-04 PROCEDURE — 99281 EMR DPT VST MAYX REQ PHY/QHP: CPT | Mod: 25

## 2025-05-04 PROCEDURE — 90675 RABIES VACCINE IM: CPT | Mod: JZ | Performed by: EMERGENCY MEDICINE

## 2025-05-04 PROCEDURE — 99282 EMERGENCY DEPT VISIT SF MDM: CPT | Performed by: EMERGENCY MEDICINE

## 2025-05-04 PROCEDURE — 71271 CT THORAX LUNG CANCER SCR C-: CPT | Performed by: STUDENT IN AN ORGANIZED HEALTH CARE EDUCATION/TRAINING PROGRAM

## 2025-05-04 PROCEDURE — 90471 IMMUNIZATION ADMIN: CPT | Performed by: EMERGENCY MEDICINE

## 2025-05-04 RX ADMIN — RABIES VACCINE 1 ML: KIT at 07:54

## 2025-05-04 ASSESSMENT — PAIN DESCRIPTION - PROGRESSION: CLINICAL_PROGRESSION: NOT CHANGED

## 2025-05-04 ASSESSMENT — COLUMBIA-SUICIDE SEVERITY RATING SCALE - C-SSRS
1. IN THE PAST MONTH, HAVE YOU WISHED YOU WERE DEAD OR WISHED YOU COULD GO TO SLEEP AND NOT WAKE UP?: NO
2. HAVE YOU ACTUALLY HAD ANY THOUGHTS OF KILLING YOURSELF?: NO
6. HAVE YOU EVER DONE ANYTHING, STARTED TO DO ANYTHING, OR PREPARED TO DO ANYTHING TO END YOUR LIFE?: NO

## 2025-05-04 ASSESSMENT — PAIN SCALES - GENERAL: PAINLEVEL_OUTOF10: 0 - NO PAIN

## 2025-05-04 ASSESSMENT — PAIN - FUNCTIONAL ASSESSMENT: PAIN_FUNCTIONAL_ASSESSMENT: 0-10

## 2025-05-04 NOTE — ED PROVIDER NOTES
Emergency Department Provider Note       History of Present Illness     History provided by: Patient  Limitations to History: None  External Records Reviewed with Brief Summary: ED note from 2025: Patient was bitten by dog.  She does not know the status of the dog's rabies status.  She was also seen on 2025 for rabies vaccine.    HPI:  Izzy Cantor is a 59 y.o. female patient is here for rabies vaccination subsequent dose.  Her wounds on her arms are healing well.  There is but no signs of fever or infection    Physical Exam   Triage vitals:  T 36.7 °C (98.1 °F)  HR 68  /84  RR 18  O2   None (Room air)    Physical Exam  Constitutional:       General: She is not in acute distress.  HENT:      Head: Normocephalic.      Nose: Nose normal.      Mouth/Throat:      Lips: Pink.   Eyes:      Extraocular Movements: Extraocular movements intact.   Cardiovascular:      Rate and Rhythm: Normal rate.      Pulses: Normal pulses.      Heart sounds: Normal heart sounds.   Pulmonary:      Effort: Pulmonary effort is normal.      Breath sounds: Normal breath sounds.   Musculoskeletal:      Cervical back: Full passive range of motion without pain.      Comments: Right arm: Dorsal aspect: areas of bruising and puncture wounds that have no signs of surrounding erythema or cellulitis  Left arm: Dorsal aspect: Areas of bruising and puncture wounds have no signs of surrounding erythema or cellulitis   Skin:     General: Skin is warm.   Neurological:      General: No focal deficit present.      Mental Status: She is alert and oriented to person, place, and time.      GCS: GCS eye subscore is 4. GCS verbal subscore is 5. GCS motor subscore is 6.   Psychiatric:         Attention and Perception: Attention normal.           Medical Decision Making & ED Course   Medical Decision Makin y.o. female patient here for subsequent rabies vaccination.  She was given intramuscular injection.  She will be discharged  home  ----    Diagnosis: Dog bite subsequent encounter.  Immunization counseling  Disposition   As a result of the work-up, the patient was discharged home.  she was informed of her diagnosis and instructed to come back with any concerns or worsening of condition.  she and was agreeable to the plan as discussed above.  she was given the opportunity to ask questions.  All of the patient's questions were answered.    Procedures   Procedures    Patient was seen independently    Jamal Carter MD  Emergency Medicine                                                            Jamal Carter MD  05/04/25 2300

## 2025-05-08 ENCOUNTER — HOSPITAL ENCOUNTER (EMERGENCY)
Facility: HOSPITAL | Age: 60
Discharge: HOME | End: 2025-05-08
Attending: STUDENT IN AN ORGANIZED HEALTH CARE EDUCATION/TRAINING PROGRAM
Payer: COMMERCIAL

## 2025-05-08 VITALS
BODY MASS INDEX: 42.68 KG/M2 | SYSTOLIC BLOOD PRESSURE: 111 MMHG | HEART RATE: 69 BPM | OXYGEN SATURATION: 96 % | RESPIRATION RATE: 18 BRPM | WEIGHT: 256.17 LBS | HEIGHT: 65 IN | TEMPERATURE: 98.1 F | DIASTOLIC BLOOD PRESSURE: 84 MMHG

## 2025-05-08 DIAGNOSIS — Z23 ENCOUNTER FOR REPEAT ADMINISTRATION OF RABIES VACCINATION: Primary | ICD-10-CM

## 2025-05-08 PROCEDURE — 99282 EMERGENCY DEPT VISIT SF MDM: CPT | Performed by: STUDENT IN AN ORGANIZED HEALTH CARE EDUCATION/TRAINING PROGRAM

## 2025-05-08 PROCEDURE — 2500000004 HC RX 250 GENERAL PHARMACY W/ HCPCS (ALT 636 FOR OP/ED): Mod: JZ | Performed by: STUDENT IN AN ORGANIZED HEALTH CARE EDUCATION/TRAINING PROGRAM

## 2025-05-08 PROCEDURE — 90471 IMMUNIZATION ADMIN: CPT | Performed by: STUDENT IN AN ORGANIZED HEALTH CARE EDUCATION/TRAINING PROGRAM

## 2025-05-08 PROCEDURE — 99281 EMR DPT VST MAYX REQ PHY/QHP: CPT | Mod: 25

## 2025-05-08 PROCEDURE — 90675 RABIES VACCINE IM: CPT | Mod: JZ | Performed by: STUDENT IN AN ORGANIZED HEALTH CARE EDUCATION/TRAINING PROGRAM

## 2025-05-08 RX ADMIN — RABIES VACCINE 1 ML: KIT at 09:27

## 2025-05-08 ASSESSMENT — COLUMBIA-SUICIDE SEVERITY RATING SCALE - C-SSRS
6. HAVE YOU EVER DONE ANYTHING, STARTED TO DO ANYTHING, OR PREPARED TO DO ANYTHING TO END YOUR LIFE?: NO
1. IN THE PAST MONTH, HAVE YOU WISHED YOU WERE DEAD OR WISHED YOU COULD GO TO SLEEP AND NOT WAKE UP?: NO
2. HAVE YOU ACTUALLY HAD ANY THOUGHTS OF KILLING YOURSELF?: NO

## 2025-05-08 ASSESSMENT — PAIN SCALES - GENERAL: PAINLEVEL_OUTOF10: 0 - NO PAIN

## 2025-05-08 ASSESSMENT — PAIN - FUNCTIONAL ASSESSMENT: PAIN_FUNCTIONAL_ASSESSMENT: 0-10

## 2025-05-09 NOTE — ED PROVIDER NOTES
HPI   Chief Complaint   Patient presents with    Rabies Subsequent Visit     Presents to ed with a c/c of rabies series. Was bitten by a dog on right anterior forearm April 29th. Needs 3 round of rabies vaccine. Denies any pain or sob. Alert and oriented X 4        This is a 59-year-old female presenting to the ED today for her fourth shot and here for rabies series of vaccines.  She was seen initially on 4/29 after a dog bite to the right upper extremity, sustained from the neighbors dog.  She states that she just finished her antibiotics and has not noticed any spreading redness or worsening swelling, worsening pain to the area, no fevers or other signs of infection.  No drainage from the wounds.  She has had no issues with her recovery after the initial injury.        History provided by:  Patient   used: No            Patient History   Medical History[1]  Surgical History[2]  Family History[3]  Social History[4]    Physical Exam   ED Triage Vitals   Temperature Heart Rate Respirations BP   05/08/25 0909 05/08/25 0909 05/08/25 0909 05/08/25 0913   36.7 °C (98.1 °F) 69 18 111/84      Pulse Ox Temp Source Heart Rate Source Patient Position   05/08/25 0909 05/08/25 0909 05/08/25 0909 05/08/25 0909   96 % Tympanic Monitor Sitting      BP Location FiO2 (%)     05/08/25 0909 --     Left arm        Physical Exam  General: well developed, well nourished 59-year-old female who is awake and alert in no apparent distress  CV: regular rate and rhythm, no murmur, no gallops, or rubs. radial pulses +2/4 bilaterally  MSK: Range of motion of the fingers, wrist and upper extremity otherwise are normal.  No swelling present.  Neuro: Sensation fully intact.  Psych: appropriate mood and affect, cooperative with exam  Skin: warm, dry, some bruising over the dorsal distal forearm and hand, bite wounds are well-healing without surrounding cellulitis or drainage.      ED Course & MDM   Diagnoses as of 05/09/25 0984    Encounter for repeat administration of rabies vaccination                 No data recorded     Joseph Coma Scale Score: 15 (05/08/25 0910 : Fam Tatum, NICOLETTE)                           Medical Decision Making  She is in no acute distress in the ED.  She is recovering well from her dog bite wounds, there is no evidence of developing infection on exam, clinically she has no symptoms concerning for developing infection.  She has had no adverse reaction to her previous rabies vaccinations, rabies vaccine was given today and she was monitored briefly with no signs of adverse reaction developing.  She was discharged in stable condition.        Procedure  Procedures       [1] History reviewed. No pertinent past medical history.  [2]   Past Surgical History:  Procedure Laterality Date    COLONOSCOPY  06/02/2020    US GUIDED THYROID BIOPSY  04/20/2015    US GUIDED THYROID BIOPSY [facility]   [3] No family history on file.  [4]   Social History  Tobacco Use    Smoking status: Every Day     Current packs/day: 1.00     Average packs/day: 1 pack/day for 41.4 years (41.4 ttl pk-yrs)     Types: Cigarettes     Start date: 1984     Passive exposure: Current    Smokeless tobacco: Never   Vaping Use    Vaping status: Never Used   Substance Use Topics    Alcohol use: Not Currently    Drug use: Never        Oswaldo Sanches DO  05/09/25 0933

## 2025-05-14 ENCOUNTER — TELEPHONE (OUTPATIENT)
Dept: ORTHOPEDIC SURGERY | Facility: CLINIC | Age: 60
End: 2025-05-14

## 2025-05-14 ENCOUNTER — HOSPITAL ENCOUNTER (OUTPATIENT)
Dept: RADIOLOGY | Facility: CLINIC | Age: 60
Discharge: HOME | End: 2025-05-14
Payer: COMMERCIAL

## 2025-05-14 ENCOUNTER — APPOINTMENT (OUTPATIENT)
Dept: ORTHOPEDIC SURGERY | Facility: CLINIC | Age: 60
End: 2025-05-14
Payer: COMMERCIAL

## 2025-05-14 DIAGNOSIS — M25.561 RIGHT KNEE PAIN, UNSPECIFIED CHRONICITY: ICD-10-CM

## 2025-05-14 DIAGNOSIS — M25.561 RIGHT KNEE PAIN, UNSPECIFIED CHRONICITY: Primary | ICD-10-CM

## 2025-05-14 DIAGNOSIS — Z01.818 PRE-OP EVALUATION: ICD-10-CM

## 2025-05-14 PROCEDURE — 73562 X-RAY EXAM OF KNEE 3: CPT | Mod: LT

## 2025-05-14 PROCEDURE — 99215 OFFICE O/P EST HI 40 MIN: CPT | Performed by: ORTHOPAEDIC SURGERY

## 2025-05-14 PROCEDURE — 73564 X-RAY EXAM KNEE 4 OR MORE: CPT | Mod: RT

## 2025-05-14 RX ORDER — ACETAMINOPHEN 325 MG/1
975 TABLET ORAL ONCE
OUTPATIENT
Start: 2025-05-14 | End: 2025-05-14

## 2025-05-14 RX ORDER — CEFAZOLIN SODIUM 2 G/100ML
2 INJECTION, SOLUTION INTRAVENOUS ONCE
OUTPATIENT
Start: 2025-05-14 | End: 2025-05-14

## 2025-05-14 RX ORDER — CELECOXIB 400 MG/1
400 CAPSULE ORAL ONCE
OUTPATIENT
Start: 2025-05-14 | End: 2025-05-14

## 2025-05-14 ASSESSMENT — PAIN - FUNCTIONAL ASSESSMENT: PAIN_FUNCTIONAL_ASSESSMENT: 0-10

## 2025-05-14 ASSESSMENT — PAIN SCALES - GENERAL: PAINLEVEL_OUTOF10: 6

## 2025-05-14 NOTE — PROGRESS NOTES
This is a consultation from Dr. Joy Crespo DO for   Chief Complaint   Patient presents with    Right Knee - Pain       This is a 59 y.o. female who presents for follow-up for right ight knee pain.  Patient states her last injection did not help much and her knees been getting much worse.  She is experience severe exacerbation of her right knee pain over the last few weeks, deep sharp stabbing pain in the anterior medial knee.  She is having a harder and harder time getting around.  She has had extensive trial of nonsurgical management occluding use of anti-inflammatories physical therapy activity modification use of assistive devices multiple injections.  Despite that she has severe and worsening pain which impacts her quality of life and activities of daily living    Physical Exam    There has been no interval change in this patient's past medical, surgical, medications, allergies, family history or social history since the most recent visit to a provider within our department. 14 point review of systems was performed, reviewed, and negative except for pertinent positives documented in the history of present illness.     Constitutional: well developed, well nourished female in no acute distress  Psychiatric: normal mood, appropriate affect  Eyes: sclera anicteric  HENT: normocephalic/atraumatic  CV: regular rate and rhythm   Respiratory: non labored breathing  Integumentary: no rash  Neurological: moves all extremities    Right knee exam: skin intact no lacerations or abrations.  1+ effusion.  Tender medial joint line. negative log roll negative patellar grind. ROM 5-100 stable to varus and valgus stress at 0 and 30 degrees. negative lachman negative posterior drawer negative geoff. 5/5 ehl/fhl/gs/ta. silt s/s/sp/dp/t. 2+ dp/pt        Imaging:  Xrays were ordered by me, they were reviewed and independently interpreted by me today, they show severe degenerative disease of the right knee bone-on-bone  arthritis subchondral sclerosis osteophyte formation Kellgren-Chon grade 4    Procedures      Impression/Plan: This is a 59 y.o. female with severe right knee arthritis that has failed nonoperative management.  Patient like to proceed with right total knee.    I had an extensive discussion with the patient regarding her condition and possible treatment options. Nonsurgical treatment for right knee knee arthritis includes activity modification, weight loss, use of a cane or other assistive device, pain medications and nonsteroidal anti-inflammatory medications, joint injections, and physical therapy. The patient has attempted non-surgical treatment for this condition during the past year for greater than 6 months and it has failed. We discussed the risks benefits and alternatives of total knee arthroplasty. Benefits of joint replacement include: Relief of pain, improvement of function, and improved quality of life. Alternatives include observation and watchful waiting, and the nonsurgical modalities noted above.     Discussed with the patient that during total knee arthroplasty prosthetic resurfacing of the patella may or may not be performed at the discretion of thesurgeon during surgery. In the event that prosthetic patellar resurfacing is not performed, nonprosthetic arthroplasty will be performed with removal of bone spurs, circumferential synovectomy and electrocautery. Patient was informed that anterior knee pain and patellofemoral crepitus can potentially occur after knee surgery with or without prosthetic patellar resurfacing.     One specific concern for this patient is their obesity.  The patient has an elevated BMI which places them at increased risk of complications related to total joint surgery especially infection. The patient has attempted to lose weight by various methods and has had limited success, but is still trying .We discussed a weight loss program and recommended the patient make an  appointment with our comprehensive weight loss clinic. The patient is aware of this increased risk, still like to proceed    Patient is at high risk for venous thromboemboli.  This is because of a history of previous venous thromboemboli and/or a genetic condition that predisposes them to venous thromboemboli.  Discussed the risks with the patient.  We will plan for risk stratified VTE prophylaxis postoperatively.    1 issue of concern for this patient is  their history of diabetes.  The patient's most recent A1c was less than 7.5 which indicates is under sufficient control to proceed with total joint surgery.  Diabetes raises the risk for complications after total joint surgeries especially risk of infection.  The patient is aware of this and would still like to proceed.        We discussed the risks of complications as well as the risks of morbidity and mortality related to surgical treatment with total joint replacement. We reviewed the fact that total joint replacement is major elective surgery with significant associated surgical and procedural risk factors as detailed below.   Risks include: Pain, blood loss, damage to nearby anatomical structures including but not limited to nerves or blood vessels muscles tendons and bone, failure surgery to ameliorate symptoms, persistence of pain surrounding the affected joint, mechanical failure of the prosthesis including but not limited to loosening of the prosthesis from bone ,breakage of the prosthesis and dislocation of the prosthesis, change in length or appearance of a limb, infection possibly necessitating further surgery, removal of the prosthesis, or limb amputation, the need for additional surgery for other reasons, blood clots, amputation, and death. No guarantees were implied or given.  All questions were answered and the patient voiced their understanding.     A complete set of surgical instructions was given to the patient. The patient was educated  "regarding preoperative nutrition, preparation of their home for postoperative rehabilitation, choosing a care partner, medical and dental clearance, the cessation of medications that can cause bleeding or presenting to risk for infection, chlorhexidine bath, nasal swab and decontamination, post operative follow up, and need for medical equipment. Patient was also educated regarding the possibility of same day surgery and related criteria and protocols. The patient will attend our joint class further education, and thereafter they will return for a preoperative visit. A presurgery education booklet was also given to the patient.     surgical plan: Right total knee  implants: DePuy  approach: Standard  DVT ppx Eliquis  drugs to stop none  allergy to abx none  post operative abx: ancef +/- vanc (per protocol)  special clearance needed cardiology    BMI Readings from Last 1 Encounters:   05/08/25 42.63 kg/m²      Lab Results   Component Value Date    CREATININE 0.95 04/05/2025     Tobacco Use: High Risk (5/14/2025)    Patient History     Smoking Tobacco Use: Every Day     Smokeless Tobacco Use: Never     Passive Exposure: Current      Computed MELD 3.0 unavailable. One or more values for this score either were not found within the given timeframe or did not fit some other criterion.  Computed MELD-Na unavailable. One or more values for this score either were not found within the given timeframe or did not fit some other criterion.       Lab Results   Component Value Date    HGBA1C 6.0 (H) 04/05/2025     No results found for: \"STAPHMRSASCR\"  "

## 2025-05-14 NOTE — TELEPHONE ENCOUNTER
Patient needs to schedule surgery at [facility] on July 24th.    Patient is currently scheduled for June 23 at [facility] and she just got a call from the hospital about the June 23rd.      Please call patient confirm the change with her.

## 2025-05-15 ENCOUNTER — HOSPITAL ENCOUNTER (EMERGENCY)
Facility: HOSPITAL | Age: 60
Discharge: HOME | End: 2025-05-15
Payer: COMMERCIAL

## 2025-05-15 VITALS
DIASTOLIC BLOOD PRESSURE: 84 MMHG | SYSTOLIC BLOOD PRESSURE: 159 MMHG | RESPIRATION RATE: 18 BRPM | OXYGEN SATURATION: 98 % | TEMPERATURE: 97.3 F | WEIGHT: 254.63 LBS | HEIGHT: 65 IN | HEART RATE: 66 BPM | BODY MASS INDEX: 42.42 KG/M2

## 2025-05-15 DIAGNOSIS — Z23 ENCOUNTER FOR VACCINATION: Primary | ICD-10-CM

## 2025-05-15 PROCEDURE — 99282 EMERGENCY DEPT VISIT SF MDM: CPT

## 2025-05-15 PROCEDURE — 99281 EMR DPT VST MAYX REQ PHY/QHP: CPT | Mod: 25

## 2025-05-15 PROCEDURE — 2500000004 HC RX 250 GENERAL PHARMACY W/ HCPCS (ALT 636 FOR OP/ED): Mod: JZ | Performed by: EMERGENCY MEDICINE

## 2025-05-15 PROCEDURE — 90471 IMMUNIZATION ADMIN: CPT | Performed by: EMERGENCY MEDICINE

## 2025-05-15 PROCEDURE — 90675 RABIES VACCINE IM: CPT | Mod: JZ | Performed by: EMERGENCY MEDICINE

## 2025-05-15 RX ADMIN — RABIES VACCINE 1 ML: KIT at 10:02

## 2025-05-15 ASSESSMENT — COLUMBIA-SUICIDE SEVERITY RATING SCALE - C-SSRS
6. HAVE YOU EVER DONE ANYTHING, STARTED TO DO ANYTHING, OR PREPARED TO DO ANYTHING TO END YOUR LIFE?: NO
1. IN THE PAST MONTH, HAVE YOU WISHED YOU WERE DEAD OR WISHED YOU COULD GO TO SLEEP AND NOT WAKE UP?: NO
1. IN THE PAST MONTH, HAVE YOU WISHED YOU WERE DEAD OR WISHED YOU COULD GO TO SLEEP AND NOT WAKE UP?: NO
6. HAVE YOU EVER DONE ANYTHING, STARTED TO DO ANYTHING, OR PREPARED TO DO ANYTHING TO END YOUR LIFE?: NO
2. HAVE YOU ACTUALLY HAD ANY THOUGHTS OF KILLING YOURSELF?: NO
2. HAVE YOU ACTUALLY HAD ANY THOUGHTS OF KILLING YOURSELF?: NO

## 2025-05-15 ASSESSMENT — PAIN SCALES - GENERAL: PAINLEVEL_OUTOF10: 0 - NO PAIN

## 2025-05-15 ASSESSMENT — PAIN - FUNCTIONAL ASSESSMENT: PAIN_FUNCTIONAL_ASSESSMENT: 0-10

## 2025-05-16 NOTE — PROGRESS NOTES
To whom it may concern,    Leanne Suazo is a patient of mine with history of moderate valve stenosis, hypertension.  Patient scheduled to have knee replacement with Dr. Mercedes.  From cardiac standpoint patient is stable to have surgery at low risk overall.  Last seen she was asymptomatic with normal vitals.  Please do not hesitate to contact me if you have any additional questions.    Jodie Abdalla MD

## 2025-05-22 ENCOUNTER — APPOINTMENT (OUTPATIENT)
Dept: ORTHOPEDIC SURGERY | Facility: CLINIC | Age: 60
End: 2025-05-22
Payer: COMMERCIAL

## 2025-05-29 ENCOUNTER — TELEPHONE (OUTPATIENT)
Dept: CARDIOLOGY | Facility: CLINIC | Age: 60
End: 2025-05-29
Payer: COMMERCIAL

## 2025-05-29 ENCOUNTER — TELEPHONE (OUTPATIENT)
Dept: ORTHOPEDIC SURGERY | Facility: CLINIC | Age: 60
End: 2025-05-29
Payer: COMMERCIAL

## 2025-05-29 DIAGNOSIS — M25.561 RIGHT KNEE PAIN, UNSPECIFIED CHRONICITY: Primary | ICD-10-CM

## 2025-05-29 NOTE — TELEPHONE ENCOUNTER
UPCOMING RT TKA 7/24/25    Patient called and stated that she is having pain on her rt leg by her knee and ankle she stated she also has a compression sock on and it makes the pain more manageable , she requested a call back from the MA because she is worried its a blood clot or something that will not allow her to have surgery, please give a call back.    [phone]

## 2025-05-29 NOTE — TELEPHONE ENCOUNTER
Patient asking for advice about a pain in her  calf and if it could be a blood clot? Please advise

## 2025-05-29 NOTE — TELEPHONE ENCOUNTER
Called patient. Having pain from heel and up into calf area. No swelling or redness.  Has bad right knee and having surgery  in July. Wondered if it could be from that. Since she is concerned about blood clot I recommended evaluation in ER since only way to confirm clot is through testing. Verbalized understanding.

## 2025-05-30 RX ORDER — METHYLPREDNISOLONE 4 MG/1
TABLET ORAL
Qty: 21 TABLET | Refills: 0 | Status: SHIPPED | OUTPATIENT
Start: 2025-05-30

## 2025-05-30 NOTE — TELEPHONE ENCOUNTER
Patient called back and let her know Kvng's response. She has had no worsening symptoms. Let her know if anything changes she should go to the ED or if she wants to be seen we can get her in next week. Patient states she's taking Naproxen. She wanted another Steroid dose pack, she had one a couple weeks ago. I let her know that I don't believe that is something they recommend you continue taking. Patient also asked if she's taking Naproxen can she not take Advil. I let patient know she should not be taking them both together. I asked if she wanted a call back to go over her medications she stated no.      Pharmacy: [facility] [address]

## 2025-06-23 NOTE — PROGRESS NOTES
Patient ID: Izzy Cantor is a 59 y.o. female.  Primary Care Provider: Joy Crespo DO    Subjective    Ref Provider: MELANIE Ball DO  PCP: MARCO ANTONIO Montes De Oca CNP    HPI: Presenting in Gyn/Oncology consultation regarding persistent vulvar dysplasia. Patient was undergoing routine surveillance for recurrent L vulvar high grade dysplasia/DEVENDRA-3 with her primary GYN, Dr. Ball, with evolution of a L vulvar lesion that underwent bx again re-demonstrating recurrence on the L labia.     Medical History:  - BMI 40  - COPD (not requiring supplemental oxygen)  - Hypothyroidism  - Diverticular disease  - HLD  - HTN  - CAD    Surgical History:  - Appendectomy  - Cholecystectomy  - L total knee replacement  - Laser conization of the cervix; two prior WLE of L vulva ()  - Partial colectomy for hyperplastic polyp  - Bilateral tubal ligation  - Thyroidectomy    Obstetric/Gynecologic History:  - FTSVD x3  - Menarche age 11  - Menopausal since , denies PMB  - History of OCP or HRT use: Intermittent OCP use, denies HRT  - Last Pap: 2020 - NILM, HPV-    Family History:   - Denies family history of breast/colon/ovarian/uterine cancers    Social History: Patient is unemployed, . Significant other name is Henry.   - Tobacco: 1 PPD smoker x 43 pack years. Has not previously tried to quit and expressed that she is not interested in quitting or reducing intake.   - Alcohol: Denies  - Illicit substances, narcotics: Denies    Health Maintenance:   - Last mammogram: , BI-RADS category 2  - Last colonoscopy/CRC screenin - hyperplastic polyp     Code Status: Full      Objective    Visit Vitals  /70 (BP Location: Right arm, Patient Position: Sitting, BP Cuff Size: Adult long)   Pulse 82   Resp 18        Interval history:  Patient is a 59 year old female with a history of DEVENDRA 3 with prior wide local excision with peripheral margins positive at 1-3 o'clock, last WLE 2022.  Here for annual exam. States she is  having lower abdominal pain x 1.5 weeks and is having a diverticulitis flare up, using Flagyl and Cipro. Persists with loose stools. Also reports bilateral lower extremity swelling that worsens throughout the day, improves when laying down. Pending PCP appointment next week for further workup. She is also scheduled for a right knee replacement in July. Continues to alternate visits with OBGYN. Has some urinary leakage with coughing / sneezing. Denies vaginal bleeding or spotting. She is sexually active, denies dyspareunia though has some vaginal dryness. Not interested in using lubrication. Mammogram up to date.      Physical Exam:    Constitutional: Doing well. MEAGAN  Eyes: PERRL  ENMT: Moist mucus membranes  Head/Neck: Supple. Symmetrical  Cardiovascular: Regular, rate and rhythm. 2+ equal pulses of the extremities  Respiratory/Thorax: CTA. RRR. Chest rise symmetrical.  Gastrointestinal: Mild bilateral lower extremity tenderness. Non-distended, soft.  Genitourinary: Cervical os visualized with no lesions, no CMT, small mobile uterus, no adnexal masses noted. Smooth vaginal walls.  Vulva with no lesions noted, well healed WLE.   Musculoskeletal: ROM intact, no joint swelling, normal strength  Extremities: Mild lower extremity edema in ankles bilaterally.   Neurological: Alert and oriented x 3. Pleasant and cooperative.  Lymphatic: No lymphadenopathy. No lymphedema  Psychological: Appropriate mood and behavior  Skin: Warm and dry, no lesions, no rashes    A complete review of systems was performed and all systems were normal except what is noted in the interval history.      Assessment/Plan    Patient is a 59 year old female with a history of DEVENDRA 3 with prior wide local excision with peripheral margins positive at 1-3 o'clock, last WLE 11/2022. MEAGAN 2.5 years. Abdominal pain.    PLAN: Follow up in 1 year or as needed, alternating with OBGYN  Consider CT A/P if pain does not subside with antibiotics, instructed to call.    Physical examination was within normal limits today.  She is currently MEAGAN.  We reviewed signs and symptoms of possible recurrence with the patient and she will call our office should she experience any of these.    I was present with the PA  student who participated in the documentation of this note.  I have personally seen and re-examined the patient and performed the medical decision-making components (assessment and plan of care). I have reviewed the PA student documentation and verified the findings in the note as written with additions or exceptions as stated in the body of this note.    STEPHAN Marks

## 2025-06-25 NOTE — DISCHARGE INSTRUCTIONS
Follow-up with your primary care physician within 1 to 2 days for further management of your current symptoms and a wound check.      Return to the emergency department sooner with worsening of symptoms or onset of new symptoms  
No

## 2025-06-26 ENCOUNTER — APPOINTMENT (OUTPATIENT)
Dept: GYNECOLOGIC ONCOLOGY | Facility: CLINIC | Age: 60
End: 2025-06-26
Payer: COMMERCIAL

## 2025-06-26 VITALS
OXYGEN SATURATION: 94 % | HEIGHT: 65 IN | WEIGHT: 256.2 LBS | DIASTOLIC BLOOD PRESSURE: 70 MMHG | SYSTOLIC BLOOD PRESSURE: 108 MMHG | RESPIRATION RATE: 18 BRPM | HEART RATE: 82 BPM | BODY MASS INDEX: 42.69 KG/M2

## 2025-06-26 DIAGNOSIS — N90.3 VULVAR DYSPLASIA: Primary | ICD-10-CM

## 2025-06-26 PROCEDURE — 99213 OFFICE O/P EST LOW 20 MIN: CPT | Performed by: NURSE PRACTITIONER

## 2025-06-26 PROCEDURE — 3078F DIAST BP <80 MM HG: CPT | Performed by: NURSE PRACTITIONER

## 2025-06-26 PROCEDURE — 3008F BODY MASS INDEX DOCD: CPT | Performed by: NURSE PRACTITIONER

## 2025-06-26 PROCEDURE — 3074F SYST BP LT 130 MM HG: CPT | Performed by: NURSE PRACTITIONER

## 2025-06-26 ASSESSMENT — PAIN SCALES - GENERAL: PAINLEVEL_OUTOF10: 4

## 2025-06-28 ENCOUNTER — APPOINTMENT (OUTPATIENT)
Dept: RADIOLOGY | Facility: HOSPITAL | Age: 60
End: 2025-06-28
Payer: COMMERCIAL

## 2025-06-28 ENCOUNTER — APPOINTMENT (OUTPATIENT)
Dept: CARDIOLOGY | Facility: HOSPITAL | Age: 60
End: 2025-06-28
Payer: COMMERCIAL

## 2025-06-28 ENCOUNTER — HOSPITAL ENCOUNTER (EMERGENCY)
Facility: HOSPITAL | Age: 60
Discharge: HOME | End: 2025-06-28
Attending: STUDENT IN AN ORGANIZED HEALTH CARE EDUCATION/TRAINING PROGRAM
Payer: COMMERCIAL

## 2025-06-28 VITALS
RESPIRATION RATE: 17 BRPM | HEIGHT: 65 IN | BODY MASS INDEX: 42.64 KG/M2 | SYSTOLIC BLOOD PRESSURE: 142 MMHG | OXYGEN SATURATION: 97 % | TEMPERATURE: 98.8 F | WEIGHT: 255.95 LBS | DIASTOLIC BLOOD PRESSURE: 65 MMHG | HEART RATE: 63 BPM

## 2025-06-28 DIAGNOSIS — R10.30 LOWER ABDOMINAL PAIN: ICD-10-CM

## 2025-06-28 DIAGNOSIS — R60.0 LOWER EXTREMITY EDEMA: Primary | ICD-10-CM

## 2025-06-28 DIAGNOSIS — R93.89 ABNORMAL CT SCAN: ICD-10-CM

## 2025-06-28 LAB
ALBUMIN SERPL BCP-MCNC: 4 G/DL (ref 3.4–5)
ALP SERPL-CCNC: 53 U/L (ref 33–136)
ALT SERPL W P-5'-P-CCNC: 41 U/L (ref 7–45)
ANION GAP SERPL CALCULATED.3IONS-SCNC: 9 MMOL/L (ref 10–20)
APPEARANCE UR: CLEAR
AST SERPL W P-5'-P-CCNC: 34 U/L (ref 9–39)
BASOPHILS # BLD AUTO: 0.06 X10*3/UL (ref 0–0.1)
BASOPHILS NFR BLD AUTO: 0.9 %
BILIRUB SERPL-MCNC: 0.6 MG/DL (ref 0–1.2)
BILIRUB UR STRIP.AUTO-MCNC: NEGATIVE MG/DL
BNP SERPL-MCNC: 72 PG/ML (ref 0–99)
BUN SERPL-MCNC: 16 MG/DL (ref 6–23)
CALCIUM SERPL-MCNC: 8.6 MG/DL (ref 8.6–10.3)
CARDIAC TROPONIN I PNL SERPL HS: 6 NG/L (ref 0–13)
CARDIAC TROPONIN I PNL SERPL HS: 7 NG/L (ref 0–13)
CHLORIDE SERPL-SCNC: 106 MMOL/L (ref 98–107)
CO2 SERPL-SCNC: 29 MMOL/L (ref 21–32)
COLOR UR: ABNORMAL
CREAT SERPL-MCNC: 0.91 MG/DL (ref 0.5–1.05)
EGFRCR SERPLBLD CKD-EPI 2021: 72 ML/MIN/1.73M*2
EOSINOPHIL # BLD AUTO: 0.28 X10*3/UL (ref 0–0.7)
EOSINOPHIL NFR BLD AUTO: 4.2 %
ERYTHROCYTE [DISTWIDTH] IN BLOOD BY AUTOMATED COUNT: 12.1 % (ref 11.5–14.5)
GLUCOSE SERPL-MCNC: 85 MG/DL (ref 74–99)
GLUCOSE UR STRIP.AUTO-MCNC: NORMAL MG/DL
HCT VFR BLD AUTO: 42.5 % (ref 36–46)
HGB BLD-MCNC: 14 G/DL (ref 12–16)
IMM GRANULOCYTES # BLD AUTO: 0.01 X10*3/UL (ref 0–0.7)
IMM GRANULOCYTES NFR BLD AUTO: 0.2 % (ref 0–0.9)
KETONES UR STRIP.AUTO-MCNC: NEGATIVE MG/DL
LACTATE SERPL-SCNC: 0.7 MMOL/L (ref 0.4–2)
LEUKOCYTE ESTERASE UR QL STRIP.AUTO: NEGATIVE
LIPASE SERPL-CCNC: 22 U/L (ref 9–82)
LYMPHOCYTES # BLD AUTO: 2.52 X10*3/UL (ref 1.2–4.8)
LYMPHOCYTES NFR BLD AUTO: 38.1 %
MAGNESIUM SERPL-MCNC: 1.9 MG/DL (ref 1.6–2.4)
MCH RBC QN AUTO: 31.3 PG (ref 26–34)
MCHC RBC AUTO-ENTMCNC: 32.9 G/DL (ref 32–36)
MCV RBC AUTO: 95 FL (ref 80–100)
MONOCYTES # BLD AUTO: 0.93 X10*3/UL (ref 0.1–1)
MONOCYTES NFR BLD AUTO: 14 %
NEUTROPHILS # BLD AUTO: 2.82 X10*3/UL (ref 1.2–7.7)
NEUTROPHILS NFR BLD AUTO: 42.6 %
NITRITE UR QL STRIP.AUTO: NEGATIVE
NRBC BLD-RTO: 0 /100 WBCS (ref 0–0)
PH UR STRIP.AUTO: 6 [PH]
PLATELET # BLD AUTO: 234 X10*3/UL (ref 150–450)
POTASSIUM SERPL-SCNC: 3.9 MMOL/L (ref 3.5–5.3)
PROT SERPL-MCNC: 6.2 G/DL (ref 6.4–8.2)
PROT UR STRIP.AUTO-MCNC: NEGATIVE MG/DL
RBC # BLD AUTO: 4.48 X10*6/UL (ref 4–5.2)
RBC # UR STRIP.AUTO: NEGATIVE MG/DL
SODIUM SERPL-SCNC: 140 MMOL/L (ref 136–145)
SP GR UR STRIP.AUTO: 1
UROBILINOGEN UR STRIP.AUTO-MCNC: NORMAL MG/DL
WBC # BLD AUTO: 6.6 X10*3/UL (ref 4.4–11.3)

## 2025-06-28 PROCEDURE — 74177 CT ABD & PELVIS W/CONTRAST: CPT

## 2025-06-28 PROCEDURE — 74177 CT ABD & PELVIS W/CONTRAST: CPT | Performed by: STUDENT IN AN ORGANIZED HEALTH CARE EDUCATION/TRAINING PROGRAM

## 2025-06-28 PROCEDURE — 99285 EMERGENCY DEPT VISIT HI MDM: CPT | Mod: 25 | Performed by: STUDENT IN AN ORGANIZED HEALTH CARE EDUCATION/TRAINING PROGRAM

## 2025-06-28 PROCEDURE — 93005 ELECTROCARDIOGRAM TRACING: CPT

## 2025-06-28 PROCEDURE — 84484 ASSAY OF TROPONIN QUANT: CPT | Performed by: CLINICAL NURSE SPECIALIST

## 2025-06-28 PROCEDURE — 2550000001 HC RX 255 CONTRASTS: Performed by: CLINICAL NURSE SPECIALIST

## 2025-06-28 PROCEDURE — 36415 COLL VENOUS BLD VENIPUNCTURE: CPT | Performed by: CLINICAL NURSE SPECIALIST

## 2025-06-28 PROCEDURE — 85025 COMPLETE CBC W/AUTO DIFF WBC: CPT | Performed by: CLINICAL NURSE SPECIALIST

## 2025-06-28 PROCEDURE — 80053 COMPREHEN METABOLIC PANEL: CPT | Performed by: CLINICAL NURSE SPECIALIST

## 2025-06-28 PROCEDURE — 83880 ASSAY OF NATRIURETIC PEPTIDE: CPT | Performed by: CLINICAL NURSE SPECIALIST

## 2025-06-28 PROCEDURE — 71046 X-RAY EXAM CHEST 2 VIEWS: CPT

## 2025-06-28 PROCEDURE — 2500000004 HC RX 250 GENERAL PHARMACY W/ HCPCS (ALT 636 FOR OP/ED): Mod: JZ | Performed by: CLINICAL NURSE SPECIALIST

## 2025-06-28 PROCEDURE — 96372 THER/PROPH/DIAG INJ SC/IM: CPT | Performed by: CLINICAL NURSE SPECIALIST

## 2025-06-28 PROCEDURE — 83735 ASSAY OF MAGNESIUM: CPT | Performed by: CLINICAL NURSE SPECIALIST

## 2025-06-28 PROCEDURE — 96374 THER/PROPH/DIAG INJ IV PUSH: CPT | Mod: 59

## 2025-06-28 PROCEDURE — 81003 URINALYSIS AUTO W/O SCOPE: CPT | Performed by: CLINICAL NURSE SPECIALIST

## 2025-06-28 PROCEDURE — 96375 TX/PRO/DX INJ NEW DRUG ADDON: CPT

## 2025-06-28 PROCEDURE — 83605 ASSAY OF LACTIC ACID: CPT | Performed by: CLINICAL NURSE SPECIALIST

## 2025-06-28 PROCEDURE — 83690 ASSAY OF LIPASE: CPT | Performed by: CLINICAL NURSE SPECIALIST

## 2025-06-28 PROCEDURE — 71046 X-RAY EXAM CHEST 2 VIEWS: CPT | Performed by: STUDENT IN AN ORGANIZED HEALTH CARE EDUCATION/TRAINING PROGRAM

## 2025-06-28 RX ORDER — ONDANSETRON HYDROCHLORIDE 2 MG/ML
4 INJECTION, SOLUTION INTRAVENOUS ONCE
Status: COMPLETED | OUTPATIENT
Start: 2025-06-28 | End: 2025-06-28

## 2025-06-28 RX ORDER — KETOROLAC TROMETHAMINE 15 MG/ML
15 INJECTION, SOLUTION INTRAMUSCULAR; INTRAVENOUS ONCE
Status: COMPLETED | OUTPATIENT
Start: 2025-06-28 | End: 2025-06-28

## 2025-06-28 RX ORDER — KETOROLAC TROMETHAMINE 10 MG/1
10 TABLET, FILM COATED ORAL EVERY 6 HOURS PRN
Qty: 20 TABLET | Refills: 0 | Status: SHIPPED | OUTPATIENT
Start: 2025-06-28 | End: 2025-07-10 | Stop reason: ALTCHOICE

## 2025-06-28 RX ORDER — DICYCLOMINE HYDROCHLORIDE 10 MG/ML
20 INJECTION INTRAMUSCULAR ONCE
Status: COMPLETED | OUTPATIENT
Start: 2025-06-28 | End: 2025-06-28

## 2025-06-28 RX ADMIN — ONDANSETRON 4 MG: 2 INJECTION INTRAMUSCULAR; INTRAVENOUS at 16:10

## 2025-06-28 RX ADMIN — IOHEXOL 75 ML: 350 INJECTION, SOLUTION INTRAVENOUS at 16:57

## 2025-06-28 RX ADMIN — KETOROLAC TROMETHAMINE 15 MG: 15 INJECTION, SOLUTION INTRAMUSCULAR; INTRAVENOUS at 16:11

## 2025-06-28 RX ADMIN — DICYCLOMINE HYDROCHLORIDE 20 MG: 10 INJECTION, SOLUTION INTRAMUSCULAR at 16:13

## 2025-06-28 ASSESSMENT — PAIN - FUNCTIONAL ASSESSMENT
PAIN_FUNCTIONAL_ASSESSMENT: 0-10

## 2025-06-28 ASSESSMENT — PAIN DESCRIPTION - LOCATION
LOCATION: ABDOMEN
LOCATION: ABDOMEN

## 2025-06-28 ASSESSMENT — PAIN SCALES - GENERAL
PAINLEVEL_OUTOF10: 0 - NO PAIN
PAINLEVEL_OUTOF10: 3
PAINLEVEL_OUTOF10: 5 - MODERATE PAIN
PAINLEVEL_OUTOF10: 6

## 2025-06-28 ASSESSMENT — PAIN DESCRIPTION - DESCRIPTORS
DESCRIPTORS: BURNING;STABBING
DESCRIPTORS: BURNING;STABBING

## 2025-06-28 ASSESSMENT — PAIN DESCRIPTION - FREQUENCY: FREQUENCY: CONSTANT/CONTINUOUS

## 2025-06-28 ASSESSMENT — PAIN DESCRIPTION - ORIENTATION: ORIENTATION: LEFT;RIGHT;LOWER

## 2025-06-28 ASSESSMENT — PAIN DESCRIPTION - PAIN TYPE: TYPE: ACUTE PAIN

## 2025-06-28 NOTE — Clinical Note
Izzy Cantor was seen and treated in our emergency department on 6/28/2025.  She may return to work on 06/30/2025.  1-3 days if needed      If you have any questions or concerns, please don't hesitate to call.      Emily Gonzalez MD

## 2025-06-28 NOTE — ED PROVIDER NOTES
Department of Emergency Medicine   ED  Provider Note  Admit Date/RoomTime: 6/28/2025  3:43 PM  [facility]        History of Present Illness:  Chief Complaint   Patient presents with   • Abdominal Pain     Presents to ed with a c/c of abd pain. Started 9days ago and had worsened today. Pain located RLQ + LLQ. Has had nausea but no vomiting and irregular lose stools. Last bm today, loose. Also reports having dark urine. Hx of diverticulitis           Izzy Cantor is a 59 y.o. female history of diverticulitis status post resection follows with Dr. Chavez DVT, hypertension, hypothyroidism, coronary artery disease, valvular abnormality, presented to the emergency department complaints of 9-day history of lower abdominal pain.  Progressively getting worse.  Patient called her gastroenterologist who prescribed her Cipro and Flagyl she has been on this medication for the last 8 days.  She has nausea but no vomiting.  No fever or chills.  No itchy watery eyes runny nose sore throat.  No cough or congestion.  Reports her bowels are now moving well.  Had a small hard bowel movement this morning.  Denies pressure burning or frequency with urination reports that her urine is dark in color she is also been experiencing lower extremity edema mostly in her ankles.  Notices at night before going in bed resolves when she wakes up in the morning.  Reports the swelling starts after she is up moving around.  Previous abdominal surgeries bowel resection, cholecystectomy, appendectomy   Patient has taken ibuprofen with little to no improvement of pain  Review of Systems:   Pertinent positives and negatives are stated within HPI, all other systems reviewed and are negative.        --------------------------------------------- PAST HISTORY ---------------------------------------------  Past Medical History:  has no past medical history on file.  Past Surgical History:  has a past surgical history that includes US guided  thyroid biopsy (04/20/2015) and Colonoscopy (06/02/2020).  Social History:  reports that she has been smoking cigarettes. She started smoking about 41 years ago. She has a 41.5 pack-year smoking history. She has been exposed to tobacco smoke. She has never used smokeless tobacco. She reports that she does not currently use alcohol. She reports that she does not use drugs.  Family History: family history is not on file.. Unless otherwise noted, family history is non contributory  The patient’s home medications have been reviewed.  Allergies: Penicillin        ---------------------------------------------------PHYSICAL EXAM--------------------------------------    GENERAL APPEARANCE: Awake and alert.   VITAL SIGNS: As per the nurses' triage record.   HEENT: Normocephalic, atraumatic. Extraocular muscles are intact. Pupils equal round and reactive to light. Conjunctiva are pink. Negative scleral icterus. Mucous membranes are moist. Tongue in the midline. Pharynx was without erythema or exudates, uvula midline  NECK: Soft Nontender and supple, full gross ROM, no meningeal signs.  CHEST: Nontender to palpation. Clear to auscultation bilaterally. No rales, rhonchi, or wheezing.   HEART: S1, S2. Regular rate and rhythm. No murmurs, gallops or rubs.  Strong and equal pulses in the extremities.   ABDOMEN: Soft, slight tenderness in bilateral quadrants.  Well-healed scar to the.  No rebound or guarding negative for Flanagan sign [place]'s point tenderness.  No peritoneal signs noted.  Nondistended, positive bowel sounds, no palpable masses.  MUSCULCSKELETAL: The calves are nontender to palpation. Full gross active range of motion. Ambulating on own with no acute difficulties.  +1 edema bilateral lower extremities distal to the knee peripheral pulses intact  NEUROLOGICAL: Awake, alert and oriented x 3. Power intact in the upper and lower extremities. Sensation is intact to light touch in the upper and lower extremities.  "  IMMUNOLOGICAL: No lymphatic streaking noted   DERM: No petechiae, rashes, or ecchymoses.          ------------------------- NURSING NOTES AND VITALS REVIEWED ---------------------------  The nursing notes within the ED encounter and vital signs as below have been reviewed by myself  /65   Pulse 63   Temp 37.1 °C (98.8 °F) (Tympanic)   Resp 17   Ht 1.651 m (5' 5\")   Wt 116 kg (255 lb 15.3 oz)   LMP 05/01/2016 (Approximate)   SpO2 97%   BMI 42.59 kg/m²     Oxygen Saturation Interpretation: 96% room air    The cardiac monitor revealed sinus rhythm with a heart rate in the 60s as interpreted by me. The cardiac monitor was ordered secondary to the patient's heart rate and to monitor the patient for dysrhythmia.       The patient’s available past medical records and past encounters were reviewed.          -----------------------DIAGNOSTIC RESULTS------------------------  LABS:    Labs Reviewed   COMPREHENSIVE METABOLIC PANEL - Abnormal       Result Value    Glucose 85      Sodium 140      Potassium 3.9      Chloride 106      Bicarbonate 29      Anion Gap 9 (*)     Urea Nitrogen 16      Creatinine 0.91      eGFR 72      Calcium 8.6      Albumin 4.0      Alkaline Phosphatase 53      Total Protein 6.2 (*)     AST 34      Bilirubin, Total 0.6      ALT 41     URINALYSIS WITH REFLEX CULTURE AND MICROSCOPIC - Abnormal    Color, Urine Light-Yellow      Appearance, Urine Clear      Specific Gravity, Urine 1.003 (*)     pH, Urine 6.0      Protein, Urine NEGATIVE      Glucose, Urine Normal      Blood, Urine NEGATIVE      Ketones, Urine NEGATIVE      Bilirubin, Urine NEGATIVE      Urobilinogen, Urine Normal      Nitrite, Urine NEGATIVE      Leukocyte Esterase, Urine NEGATIVE     MAGNESIUM - Normal    Magnesium 1.90     LIPASE - Normal    Lipase 22      Narrative:     Venipuncture immediately after or during the administration of Metamizole may lead to falsely low results. Testing should be performed immediately prior " to Metamizole dosing.   SERIAL TROPONIN-INITIAL - Normal    Troponin I, High Sensitivity 7      Narrative:     Less than 99th percentile of normal range cutoff-  Female and children under 18 years old <14 ng/L; Male <21 ng/L: Negative  Repeat testing should be performed if clinically indicated.     Female and children under 18 years old 14-50 ng/L; Male 21-50 ng/L:  Consistent with possible cardiac damage and possible increased clinical   risk. Serial measurements may help to assess extent of myocardial damage.     >50 ng/L: Consistent with cardiac damage, increased clinical risk and  myocardial infarction. Serial measurements may help assess extent of   myocardial damage.      NOTE: Children less than 1 year old may have higher baseline troponin   levels and results should be interpreted in conjunction with the overall   clinical context.     NOTE: Troponin I testing is performed using a different   testing methodology at [facility] than at other   [facility]. Direct result comparisons should only   be made within the same method.   LACTATE - Normal    Lactate 0.7      Narrative:     Venipuncture immediately after or during the administration of Metamizole may lead to falsely low results. Testing should be performed immediately prior to Metamizole dosing.   SERIAL TROPONIN, 1 HOUR - Normal    Troponin I, High Sensitivity 6      Narrative:     Less than 99th percentile of normal range cutoff-  Female and children under 18 years old <14 ng/L; Male <21 ng/L: Negative  Repeat testing should be performed if clinically indicated.     Female and children under 18 years old 14-50 ng/L; Male 21-50 ng/L:  Consistent with possible cardiac damage and possible increased clinical   risk. Serial measurements may help to assess extent of myocardial damage.     >50 ng/L: Consistent with cardiac damage, increased clinical risk and  myocardial infarction. Serial measurements may help assess extent of   myocardial  damage.      NOTE: Children less than 1 year old may have higher baseline troponin   levels and results should be interpreted in conjunction with the overall   clinical context.     NOTE: Troponin I testing is performed using a different   testing methodology at [facility] than at other   [facility]. Direct result comparisons should only   be made within the same method.   B-TYPE NATRIURETIC PEPTIDE - Normal    BNP 72      Narrative:        <100 pg/mL - Heart failure unlikely  100-299 pg/mL - Intermediate probability of acute heart                  failure exacerbation. Correlate with clinical                  context and patient history.    >=300 pg/mL - Heart Failure likely. Correlate with clinical                  context and patient history.    BNP testing is performed using different testing methodology at [facility] than at other [facility]. Direct result comparisons should only be made within the same method.      TROPONIN SERIES- (INITIAL, 1 HR)    Narrative:     The following orders were created for panel order Troponin I Series, High Sensitivity (0, 1 HR).  Procedure                               Abnormality         Status                     ---------                               -----------         ------                     Troponin I, High Sensiti...[[id number]]  Normal              Final result               Troponin, High Sensitivi...[[id number]]  Normal              Final result                 Please view results for these tests on the individual orders.   CBC WITH AUTO DIFFERENTIAL    WBC 6.6      nRBC 0.0      RBC 4.48      Hemoglobin 14.0      Hematocrit 42.5      MCV 95      MCH 31.3      MCHC 32.9      RDW 12.1      Platelets 234      Neutrophils % 42.6      Immature Granulocytes %, Automated 0.2      Lymphocytes % 38.1      Monocytes % 14.0      Eosinophils % 4.2      Basophils % 0.9      Neutrophils Absolute 2.82      Immature Granulocytes Absolute,  Automated 0.01      Lymphocytes Absolute 2.52      Monocytes Absolute 0.93      Eosinophils Absolute 0.28      Basophils Absolute 0.06     URINALYSIS WITH REFLEX CULTURE AND MICROSCOPIC    Narrative:     The following orders were created for panel order Urinalysis with Reflex Culture and Microscopic.  Procedure                               Abnormality         Status                     ---------                               -----------         ------                     Urinalysis with Reflex C...[[id number]]  Abnormal            Final result               Extra Urine Gray Tube[[id number]]                                                         Please view results for these tests on the individual orders.   EXTRA URINE GRAY TUBE       As interpreted by me, the above displayed labs are abnormal. All other labs obtained during this visit were within normal range or not returned as of this dictation.      EKG Interpretation    7269 EKG interpreted myself independently, EKG shows sinus bradycardia, rate 59 bpm, SC interval 140, QRS 72, QTc 462, QTc 457, patient has no ST elevation or depression, negative for acute MI. [JATIN]           XR chest 2 views   Final Result   No acute cardiopulmonary process.             MACRO:   None.        Signed by: Burton Tenorio 6/28/2025 6:04 PM   Dictation workstation:   [id number]      CT abdomen pelvis w IV contrast   Final Result   No acute abnormality within the abdomen or pelvis.        Degenerative changes of the lumbar spine likely resulting in   descending nerve root impingement particularly at L5-S1 on the left   as described above.        Colonic diverticulosis without acute diverticulitis. Uncomplicated   sigmoid anastomosis.        MACRO:   None.        Signed by: Burton Tenorio 6/28/2025 6:07 PM   Dictation workstation:   [id number]              XR chest 2 views   Final Result   No acute cardiopulmonary process.             MACRO:   None.        Signed by: Burton  Coby 6/28/2025 6:04 PM   Dictation workstation:   [id number]      CT abdomen pelvis w IV contrast   Final Result   No acute abnormality within the abdomen or pelvis.        Degenerative changes of the lumbar spine likely resulting in   descending nerve root impingement particularly at L5-S1 on the left   as described above.        Colonic diverticulosis without acute diverticulitis. Uncomplicated   sigmoid anastomosis.        MACRO:   None.        Signed by: Burton Tenorio 6/28/2025 6:07 PM   Dictation workstation:   [id number]              ------------------------------ ED COURSE/MEDICAL DECISION MAKING----------------------  Medical Decision Making:   Exam: A medically appropriate exam performed, outlined above, given the known history and presentation.    History obtained from: Review of medical record nursing notes patient  Social Determinants of Health considered during this visit: Takes care of her self at home      PAST MEDICAL HISTORY/Chronic Conditions Affecting Care     has no past medical history on file.       CC/HPI Summary, Social Determinants of health, Records Reviewed, DDx, testing done/not done, ED Course, Reassessment, disposition considerations/shared decision making with patient, consults, disposition:   Plan  Lactate, magnesium, troponin, CBC, BNP, lipase, CMP, urine    Medical Decision Making/Differential Diagnosis:  Differentials include but not limited to diverticulitis versus constipation versus UTI versus kidney stone versus dehydration versus obstruction versus failure versus CHF versus dependent edema  Review  Lipase within normal limits  Magnesium within normal limits  No elevation white blood cell count  Hemoglobin 14  Troponin 7 repeat troponin 6  BNP 72  Urine unremarkable no signs of infection  Normal LFTs  Normal renal function patient present to the emergency department complaints of lower abdominal pain onset of symptoms 9 days has been on Cipro Flagyl by her  gastroenterologist and started a new dose  And has 7 days left.  CT of the abdomen pelvis showed No acute abnormality within the abdomen or pelvis.  Degenerative changes of the lumbar spine likely resulting I  indescending nerve root impingement particularly at L5-S1 on the left as described above.  Colonic diverticulosis without acute diverticulitis. Uncomplicated sigmoid anastomosis.  LFTs unremarkable.  Normal renal function.  Electrolytes unremarkable.  Lipase within normal limits.  Lactate within normal limits.  No elevation white blood cell count patient is not anemic urine is not consistent with UTI.  Based on her clinical presentation history and symptoms consistent with abdominal pain we will have her continue with her antibiotic as recommended by her gastroenterologist close follow-up with your gastroenterologist continue with your Bentyl bland diet return with any worsening symptoms or concerns will add Toradol for pain.  Patient also complaining of bilateral lower extremity edema EKG per attending note no ST elevation or arrhythmia noted.  proBNP is not elevated symptoms improved upon awakening worsened throughout the day with activity suspicious for dependent edema.  Advised NAFISA hose close follow-up with primary care for reevaluation patient verbalized understanding amenable plan amenable to discharge patient seen and evaluated with attending physician Dr. Gonzalez   [facility] for discharge elective discharge planning no signs of sepsis or toxicity she is not hypotensive tachycardic or febrile ambulating with no difficulty.  Impression  Abdominal pain continue with her antibiotic therapy follow-up with her gastroenterologist  Lower extremity edema follow-up with primary care compression stockings  Incidental findings on CT reviewed with her      PROCEDURES  Unless otherwise noted below, none      CONSULTS:   None      ED Course as of 06/28/25 1911   Sat Jun 28, 2025   1615 EKG interpreted myself independently,  EKG shows sinus bradycardia, rate 59 bpm, UT interval 140, QRS 72, QTc 462, QTc 457, patient has no ST elevation or depression, negative for acute MI. [JATIN]   1800 Patient up ambulating no acute distress.  Reports improvement in pain with ER interventions.  No signs of distress. [TB]      ED Course User Index  [JATIN] Oli Santana DO  [TB] KENRICK Olea-CNP         Diagnoses as of 06/28/25 1911   Lower extremity edema   Lower abdominal pain   Abnormal CT scan         This patient has remained hemodynamically stable during their ED course.      Critical Care: None      Counseling:  The emergency provider has spoken with the patient and discussed today’s results, in addition to providing specific details for the plan of care and counseling regarding the diagnosis and prognosis.  Questions are answered at this time and they are agreeable with the plan.         --------------------------------- IMPRESSION AND DISPOSITION ---------------------------------    IMPRESSION  1. Lower extremity edema    2. Lower abdominal pain    3. Abnormal CT scan        DISPOSITION  Disposition: Discharge home  Patient condition is stable patient reports improvement of pain upon reevaluation with ER interventions        NOTE: This report was transcribed using voice recognition software. Every effort was made to ensure accuracy; however, inadvertent computerized transcription errors may be present      TRINA Olea  06/28/25 1911

## 2025-06-28 NOTE — DISCHARGE INSTRUCTIONS
Continue with your antibiotic therapy as recommended by your gastroenterologist.  Follow-up with gastroenterologist for reevaluation in the next 1 to 2 days.  Follow-up with primary care physician 1 to 2 days.  Try wearing compression stockings to help with lower extremity edema.  Return with any worsening symptoms or concerns bland diet  Continue with your Bentyl as needed for pain.  Added Toradol to help with pain do not take any other anti-inflammatory medications with this medicine due to being similar types of medicine.  Return with any worsening symptoms or concerns  Your CT today was abnormal does require follow-up with primary care physician.

## 2025-06-30 LAB
ATRIAL RATE: 59 BPM
P AXIS: 55 DEGREES
P OFFSET: 201 MS
P ONSET: 155 MS
PR INTERVAL: 140 MS
Q ONSET: 225 MS
QRS COUNT: 10 BEATS
QRS DURATION: 72 MS
QT INTERVAL: 462 MS
QTC CALCULATION(BAZETT): 457 MS
QTC FREDERICIA: 459 MS
R AXIS: 21 DEGREES
T AXIS: 60 DEGREES
T OFFSET: 456 MS
VENTRICULAR RATE: 59 BPM

## 2025-07-01 ENCOUNTER — APPOINTMENT (OUTPATIENT)
Dept: PRIMARY CARE | Facility: CLINIC | Age: 60
End: 2025-07-01
Payer: COMMERCIAL

## 2025-07-01 VITALS
WEIGHT: 256 LBS | OXYGEN SATURATION: 95 % | BODY MASS INDEX: 42.65 KG/M2 | SYSTOLIC BLOOD PRESSURE: 139 MMHG | HEIGHT: 65 IN | HEART RATE: 70 BPM | TEMPERATURE: 97.3 F | DIASTOLIC BLOOD PRESSURE: 60 MMHG

## 2025-07-01 DIAGNOSIS — Z72.0 TOBACCO USE: ICD-10-CM

## 2025-07-01 DIAGNOSIS — R60.0 LOWER EXTREMITY EDEMA: Primary | ICD-10-CM

## 2025-07-01 PROCEDURE — 99213 OFFICE O/P EST LOW 20 MIN: CPT | Performed by: FAMILY MEDICINE

## 2025-07-01 PROCEDURE — 3075F SYST BP GE 130 - 139MM HG: CPT | Performed by: FAMILY MEDICINE

## 2025-07-01 PROCEDURE — 3078F DIAST BP <80 MM HG: CPT | Performed by: FAMILY MEDICINE

## 2025-07-01 PROCEDURE — 3008F BODY MASS INDEX DOCD: CPT | Performed by: FAMILY MEDICINE

## 2025-07-01 ASSESSMENT — PATIENT HEALTH QUESTIONNAIRE - PHQ9
SUM OF ALL RESPONSES TO PHQ9 QUESTIONS 1 AND 2: 0
2. FEELING DOWN, DEPRESSED OR HOPELESS: NOT AT ALL
1. LITTLE INTEREST OR PLEASURE IN DOING THINGS: NOT AT ALL

## 2025-07-01 ASSESSMENT — ENCOUNTER SYMPTOMS: RESPIRATORY NEGATIVE: 1

## 2025-07-01 NOTE — PROGRESS NOTES
"Subjective   Patient ID: Izzy Cantor is a 60 y.o. female who presents for Joint Swelling (Pt feet and ankles are swollen for a week now some pressure when walking swelling did go down ).    Lower extremity edema: new and began on 6/21/2025, seen at the ER on 6/28/2025 for this when she was primarily seen for abdominal pain, swelling worsens as the day progresses, patient instructed to wear NAFISA hose and follow up here, has worn stockings for 2 days, swelling has improved, patient denies eating salty foods, patient states she drinks adequate amounts of water daily, patient has had leg swelling in the past when it is hot outside.    Review of Systems   Respiratory: Negative.     Cardiovascular:  Positive for leg swelling.     Objective   /60 (BP Location: Left arm, Patient Position: Sitting, BP Cuff Size: Adult)   Pulse 70   Temp 36.3 °C (97.3 °F) (Temporal)   Ht 1.651 m (5' 5\")   Wt 116 kg (256 lb)   LMP 05/01/2016 (Approximate)   SpO2 95%   BMI 42.60 kg/m²     Physical Exam  Vitals and nursing note reviewed.   Constitutional:       General: She is not in acute distress.     Appearance: Normal appearance. She is obese. She is not ill-appearing.   Cardiovascular:      Rate and Rhythm: Normal rate and regular rhythm.      Heart sounds: Murmur (II/VI ALAN) heard.   Pulmonary:      Effort: Pulmonary effort is normal.      Breath sounds: Normal breath sounds.   Musculoskeletal:      Right lower leg: Edema (trace, distal leg, nonpitting of the ankle) present.      Left lower leg: Edema (trace, distal leg, nonpitting of the ankle) present.   Neurological:      Mental Status: She is alert.     Assessment/Plan   Diagnoses and all orders for this visit:  Lower extremity edema  Recurrence  Adequate hydration.  Limit salt intake.  Wear compression stockings as needed for edema.  If not better with above guidelines, will refer to vascular medicine.  Tobacco use  LDCT ordered.  Will notify with results.      "

## 2025-07-03 ENCOUNTER — TELEPHONE (OUTPATIENT)
Dept: ORTHOPEDIC SURGERY | Facility: CLINIC | Age: 60
End: 2025-07-03
Payer: COMMERCIAL

## 2025-07-03 NOTE — TELEPHONE ENCOUNTER
Patient called stating she has surgery on 7/24/25 rt tka, patient has degenerative disc disease. Patient stated she would have a spinal day of surgery and wanted to know if this would affect her.

## 2025-07-09 NOTE — PREADMISSION SCREENING NOTE
PRE-SURGERY DISCHARGE PLANNING    PROCEDURE: knee replacement      DATE OF SURGERY: 7/24/25      ADDRESS YOU WILL GO TO AFTER DISCHARGE:  [address] [place] [id number]       NAME OF CARE PARTNER :( Who will help you after surgery?)  Elie Beard    DO YOU NEED ANYTHING TO HELP YOU WALK CURRENTLY?  No mobility aides used at this time    STAIRS INTO HOUSE: (number)  3  STAIRS INSIDE HOUSE: (number)  12     MEDICAL EQUIPMENT AT HOME:  Cane walker toilet seat shower chair ice packs    JOINT CLASS DATE: 5/22/25      SAME DAY SURGERY: YES       PRIMARY INSURANCE:

## 2025-07-10 ENCOUNTER — PRE-ADMISSION TESTING (OUTPATIENT)
Dept: PREADMISSION TESTING | Facility: HOSPITAL | Age: 60
End: 2025-07-10
Payer: COMMERCIAL

## 2025-07-10 ENCOUNTER — HOSPITAL ENCOUNTER (OUTPATIENT)
Dept: RADIOLOGY | Facility: HOSPITAL | Age: 60
Discharge: HOME | End: 2025-07-10
Payer: COMMERCIAL

## 2025-07-10 VITALS
WEIGHT: 251 LBS | HEART RATE: 78 BPM | RESPIRATION RATE: 16 BRPM | BODY MASS INDEX: 41.82 KG/M2 | TEMPERATURE: 96.8 F | DIASTOLIC BLOOD PRESSURE: 71 MMHG | OXYGEN SATURATION: 95 % | HEIGHT: 65 IN | SYSTOLIC BLOOD PRESSURE: 128 MMHG

## 2025-07-10 DIAGNOSIS — Z01.818 PREOP TESTING: Primary | ICD-10-CM

## 2025-07-10 DIAGNOSIS — Z01.818 PRE-OP EVALUATION: ICD-10-CM

## 2025-07-10 PROCEDURE — 99204 OFFICE O/P NEW MOD 45 MIN: CPT | Performed by: NURSE PRACTITIONER

## 2025-07-10 PROCEDURE — 87081 CULTURE SCREEN ONLY: CPT | Mod: TRILAB

## 2025-07-10 PROCEDURE — 77073 BONE LENGTH STUDIES: CPT

## 2025-07-10 RX ORDER — CHLORHEXIDINE GLUCONATE ORAL RINSE 1.2 MG/ML
SOLUTION DENTAL
Qty: 473 ML | Refills: 0 | Status: SHIPPED | OUTPATIENT
Start: 2025-07-10 | End: 2025-07-24

## 2025-07-10 ASSESSMENT — ENCOUNTER SYMPTOMS
ENDOCRINE NEGATIVE: 1
HEMATOLOGIC/LYMPHATIC NEGATIVE: 1
EYES NEGATIVE: 1
ABDOMINAL PAIN: 1
VOMITING: 0
PALPITATIONS: 0
SHORTNESS OF BREATH: 1
NAUSEA: 0
DIARRHEA: 0
CONSTIPATION: 1
ARTHRALGIAS: 1
ACTIVITY CHANGE: 1
PSYCHIATRIC NEGATIVE: 1

## 2025-07-10 ASSESSMENT — DUKE ACTIVITY SCORE INDEX (DASI)
CAN YOU RUN A SHORT DISTANCE: NO
CAN YOU DO MODERATE WORK AROUND THE HOUSE LIKE VACUUMING, SWEEPING FLOORS OR CARRYING GROCERIES: YES
CAN YOU TAKE CARE OF YOURSELF (EAT, DRESS, BATHE, OR USE TOILET): YES
CAN YOU DO LIGHT WORK AROUND THE HOUSE LIKE DUSTING OR WASHING DISHES: YES
CAN YOU CLIMB A FLIGHT OF STAIRS OR WALK UP A HILL: YES
CAN YOU WALK INDOORS, SUCH AS AROUND YOUR HOUSE: YES
CAN YOU WALK A BLOCK OR TWO ON LEVEL GROUND: YES
CAN YOU DO HEAVY WORK AROUND THE HOUSE LIKE SCRUBBING FLOORS OR LIFTING AND MOVING HEAVY FURNITURE: NO
CAN YOU HAVE SEXUAL RELATIONS: NO
CAN YOU PARTICIPATE IN STRENOUS SPORTS LIKE SWIMMING, SINGLES TENNIS, FOOTBALL, BASKETBALL, OR SKIING: NO
CAN YOU PARTICIPATE IN MODERATE RECREATIONAL ACTIVITIES LIKE GOLF, BOWLING, DANCING, DOUBLES TENNIS OR THROWING A BASEBALL OR FOOTBALL: NO
TOTAL_SCORE: 23.45
CAN YOU DO YARD WORK LIKE RAKING LEAVES, WEEDING OR PUSHING A MOWER: YES
DASI METS SCORE: 5.6

## 2025-07-10 NOTE — PREPROCEDURE INSTRUCTIONS
PAT DISCHARGE INSTRUCTIONS    Please call the Same Day Surgery (SDS) Department of the hospital where your procedure will be performed after 2:00 PM the day before your surgery. If you are scheduled on a Monday, or a Tuesday following a Monday holiday, you will need to call on the last business day prior to your surgery.    [facility]  [address] [place] [address] [phone]  [facility]  [address] [facility], [id number] [phone]  [facility]  [address] [facility]  [place], [place] [id number] [phone]    Please let your surgeon know if:      You develop any open sores, shingles, burning or painful urination as these may increase your risk of an infection.   You no longer wish to have the surgery.   Any other personal circumstances change that may lead to the need to cancel or defer this surgery-such as being sick or getting admitted to any hospital within one week of your planned procedure.    Your contact details change, such as a change of address or phone number.    Starting now:     Please DO NOT drink alcohol or smoke for 24 hours before surgery. It is well known that quitting smoking can make a huge difference to your health and recovery from surgery. The longer you abstain from smoking, the better your chances of a healthy recovery. If you need help with quitting, call 1-800-QUIT-NOW to be connected to a trained counselor who will discuss the best methods to help you quit.     Before your surgery:    Please stop all supplements 7 days prior to surgery. Or as directed by your surgeon.   Please stop taking NSAID pain medicine such as Advil and Motrin 7 days before surgery.    If you develop any fever, cough, cold, rashes, cuts, scratches, scrapes, urinary symptoms or infection anywhere on your body (including teeth and gums) prior to surgery, please call  your surgeon’s office as soon as possible. This may require treatment to reduce the chance of cancellation on the day of surgery.    The day before your surgery:   DIET- Please follow the diet instructions at the top of your packet.   Get a good night’s rest.  Use the special soap for bathing if you have been instructed to use one.    Scheduled surgery times may change and you will be notified if this occurs - please check your personal voicemail for any updates.     On the morning of surgery:   Wear comfortable, loose fitting clothes which open in the front. Please do not wear moisturizers, creams, lotions, makeup or perfume.    Please bring with you to surgery:   Photo ID and insurance card   Current list of medicines and allergies   Pacemaker/ Defibrillator/Heart stent cards   CPAP machine and mask    Slings/ splints/ crutches   A copy of your complete advanced directive/DHPOA.    Please do NOT bring with you to surgery:   All jewelry and valuables should be left at home.   Prosthetic devices such as contact lenses, hearing aids, dentures, eyelash extensions, hairpins and body piercings must be removed prior to going in to the surgical suite.    After outpatient surgery:   A responsible adult MUST accompany you at the time of discharge and stay with you for 24 hours after your surgery. You may NOT drive yourself home after surgery.    Do not drive, operate machinery, make critical decisions or do activities that require co-ordination or balance until after a night’s sleep.   Do not drink alcoholic beverages for 24 hours.   Instructions for resuming your medications will be provided by your surgeon.    CALL YOUR DOCTOR AFTER SURGERY IF YOU HAVE:     Chills and/or a fever of 101° F or higher.    Redness, swelling, pus or drainage from your surgical wound or a bad smell from the wound.    Lightheadedness, fainting or confusion.    Persistent vomiting (throwing up) and are not able to eat or drink for 12 hours.     Three or more loose, watery bowel movements in 24 hours (diarrhea).   Difficulty or pain while urinating( after non-urological surgery)    Pain and swelling in your legs, especially if it is only on one side.    Difficulty breathing or are breathing faster than normal.    Any new concerning symptoms.            FOLLOW ALL INSTRUCTIONS GIVEN TO YOU BY DR BURGOS      Preoperative Fasting Guidelines    Why must I stop eating and drinking near surgery time?  With sedation, food or liquid in your stomach can enter your lungs causing serious complications  Increases nausea and vomiting    When do I need to stop eating and drinking before my surgery?  Do not eat any food after midnight the night before your surgery/procedure.  You may have up to 13 ounces of clear liquid until TWO hours before your instructed arrival time to the hospital.  This includes water, black tea/coffee, (no milk or cream) apple juice, and electrolyte drinks (Gatorade)  You may chew gum until TWO hours before your surgery/procedure        Patient Information: Pre-Operative Infection Prevention Measures     Why did I have my nose, under my arms, and groin swabbed?  The purpose of the swab is to identify Staphylococcus aureus inside your nose or on your skin.  The swab was sent to the laboratory for culture.  A positive swab/culture for Staphylococcus aureus is called colonization or carriage.      What is Staphylococcus aureus?  Staphylococcus aureus, also known as “staph”, is a germ found on the skin or in the nose of healthy people.  Sometimes Staphylococcus aureus can get into the body and cause an infection.  This can be minor (such as pimples, boils, or other skin problems).  It might also be serious (such as a blood infection, pneumonia, or a surgical site infection).    What is Staphylococcus aureus colonization or carriage?  Colonization or carriage means that a person has the germ but is not sick from it.  These bacteria can be spread on the  hands or when breathing or sneezing.    How is Staphylococcus aureus spread?  It is most often spread by close contact with a person or item that carries it.    What happens if my culture is positive for Staphylococcus aureus?  Your doctor/medical team will use this information to guide any antibiotic treatment which may be necessary.  Regardless of the culture results, we will clean the inside of your nose with a betadine swab just before you have your surgery.      Will I get an infection if I have Staphylococcus aureus in my nose or on my skin?  Anyone can get an infection with Staphylococcus aureus.  However, the best way to reduce your risk of infection is to follow the instructions provided to you for the use of your CHG soap and dental rinse.        Patient Information: Oral/Dental Rinse    What is oral/dental rinse?   It is a mouthwash. It is a way of cleaning the mouth with a germ-killing solution before your surgery.  The solution contains chlorhexidine, commonly known as CHG.   It is used inside the mouth to kill a bacteria known as Staphylococcus aureus.  Let your doctor know if you are allergic to Chlorhexidine.    Why do I need to use CHG oral/dental rinse?  The CHG oral/dental rinse helps to kill a bacteria in your mouth known as Staphylococcus aureus.     This reduces the risk of infection at the surgical site.      Using your CHG oral/dental rinse  STEPS:  Use your CHG oral/dental rinse after you brush your teeth the night before (at bedtime) and the morning of your surgery.  Follow all directions on your prescription label.    Use the cap on the container to measure 15ml   Swish (gargle if you can) the mouthwash in your mouth for at least 30 seconds, (do not swallow) and spit out  After you use your CHG rinse, do not rinse your mouth with water, drink or eat.  Please refer to the prescription label for the appropriate time to resume oral intake      What side effects might I have using the CHG  oral/dental rinse?  CHG rinse will stick to plaque on the teeth.  Brush and floss just before use.  Teeth brushing will help avoid staining of plaque during use.      Patient Information: Home Preoperative Antibacterial Shower      What is a home preoperative antibacterial shower?  This shower is a way of cleaning the skin with a germ-killing solution before surgery.  The solution contains chlorhexidine, commonly known as CHG.  CHG is a skin cleanser with germ-killing ability.  Let your doctor know if you are allergic to chlorhexidine.    Why do I need to take a preoperative antibacterial shower?  Skin is not sterile.  It is best to try to make your skin as free of germs as possible before surgery.  Proper cleansing with a germ-killing soap before surgery can lower the number of germs on your skin.  This helps to reduce the risk of infection at the surgical site.  Following the instructions listed below will help you prepare your skin for surgery.      How do I use the solution?  Steps:  Begin using your CHG soap 5 days before your scheduled surgery on ________________________.    First, wash and rinse your hair using the CHG soap. Keep CHG soap away from ear canals and eyes.  Rinse completely, do not condition.  Hair extensions should be removed.  Wash your face with your normal soap and rinse.    Apply the CHG solution to a clean wet washcloth.  Turn the water off or move away from the water spray to avoid premature rinsing of the CHG soap as you are applying.   Firmly lather your entire body from the neck down.  Do not use on your face.  Pay special attention to the area(s) where your incision(s) will be located unless they are on your face.  Avoid scrubbing your skin too hard.  The important point is to have the CHG soap sit on your skin for 3 minutes.    When the 3 minutes are up, turn on the water and rinse the CHG solution off your body completely.   DO NOT wash with regular soap after you have used the CHG  soap solution  Pat yourself dry with a clean, freshly-laundered towel.  DO NOT apply powders, deodorants, or lotions.  Dress in clean, freshly laundered nightclothes.    Be sure to sleep with clean, freshly laundered sheets.  Be aware that CHG will cause stains on fabrics; if you wash them with bleach after use.  Rinse your washcloth and other linens that have contact with CHG completely.  Use only non-chlorine detergents to launder the items used.   The morning of surgery is the fifth day.  Repeat the above steps and dress in clean comfortable clothing     Whom should I contact if I have any questions regarding the use of CHG soap?  Call the [facility] at [phone] if you have any questions.                    Medication List            Accurate as of July 10, 2025  9:32 AM. Always use your most recent med list.                albuterol 90 mcg/actuation inhaler  Commonly known as: ProAir HFA  Inhale 2 puffs every 6 hours.  Medication Adjustments for Surgery: Take/Use as prescribed     ALIGN (B.INFANTIS) ORAL  Additional Medication Adjustments for Surgery: Take last dose 7 days before surgery     atorvastatin 80 mg tablet  Commonly known as: Lipitor  Take 1 tablet (80 mg) by mouth once daily.  Medication Adjustments for Surgery: Take/Use as prescribed  Notes to patient: CONTINUE PER USUAL/TAKE NIGHT BEFORE SURGERY     budesonide-formoterol 160-4.5 mcg/actuation inhaler  Commonly known as: Symbicort  Inhale 2 puffs 2 times a day. Rinse mouth with water after use to reduce aftertaste and incidence of candidiasis. Do not swallow.  Medication Adjustments for Surgery: Take/Use as prescribed  Notes to patient: Pt not taking     Calcium 600 + D(3) 600 mg-5 mcg (200 unit) capsule  Generic drug: calcium carbonate-vitamin D3  Medication Adjustments for Surgery: Do Not take on the morning of surgery     ezetimibe 10 mg tablet  Commonly known as:  Zetia  Take 1 tablet (10 mg) by mouth once daily.  Medication Adjustments for Surgery: Take last dose 1 day (24 hours) before surgery  Notes to patient: Last dose before surgery will be July 22 evening     levothyroxine 112 mcg tablet  Commonly known as: Synthroid, Levoxyl  Take 1 tablet (112 mcg) by mouth once daily.  Medication Adjustments for Surgery: Take on the morning of surgery     MetamuciL 0.4 gram capsule  Generic drug: psyllium  Medication Adjustments for Surgery: Take/Use as prescribed     metoprolol succinate XL 50 mg 24 hr tablet  Commonly known as: Toprol-XL  Take 1 tablet (50 mg) by mouth once daily.  Medication Adjustments for Surgery: Take/Use as prescribed  Notes to patient: CONTINUE PER USUAL/TAKE NIGHT BEFORE SURGERY     MULTIVITAMIN ORAL  Additional Medication Adjustments for Surgery: Take last dose 7 days before surgery     pantoprazole 40 mg EC tablet  Commonly known as: ProtoNix  Medication Adjustments for Surgery: Take/Use as prescribed  Notes to patient: CONTINUE PER USUAL/TAKE NIGHT BEFORE SURGERY     sucralfate 1 gram tablet  Commonly known as: Carafate  Medication Adjustments for Surgery: Do Not take on the morning of surgery

## 2025-07-10 NOTE — H&P (VIEW-ONLY)
CPM/PAT Evaluation       Name: Izzy Cantor (Izzy Cantor)  /Age: 1965/60 y.o.     In-Person       Chief Complaint: right knee pain     HPI    Pt is a 60 year old female with right knee pain. Pt reports she has experienced right knee pain over the past 8 years that continues to worsen. Pt stated she was managing her right knee pain with cortisone injections but is not ready to have surgery. Pt describes the pain as an aching pain that occasionally radiate to her right ankle. The pain worsens with prolonged walking and ROM. Pt has been taking Tylenol with only minimal relief from the pain. Pt stated at times her right knee feels unstable but she denies falls. Pt was examined by her surgeon and has been scheduled for right total knee replacement. Pt denies CP, SOB, or dizziness.     Past Medical History:   Diagnosis Date    Constipation     COPD (chronic obstructive pulmonary disease) (Multi)     Coronary artery disease     Diverticular disease     GERD (gastroesophageal reflux disease)     Goiter     History of vulvar dysplasia     Hyperlipidemia     Hypertension     Hypothyroidism     Moderate aortic valve stenosis     Nicotine dependence     Prediabetes     Stress incontinence        Past Surgical History:   Procedure Laterality Date    APPENDECTOMY      CARDIAC CATHETERIZATION      CHOLECYSTECTOMY      COLECTOMY PARTIAL / TOTAL      partial colectomy for hyperplastic polyp    COLONOSCOPY  2020    KNEE ARTHROPLASTY Left     OTHER SURGICAL HISTORY      wide local excision of vulva x 3    TOTAL THYROIDECTOMY      TUBAL LIGATION      US GUIDED THYROID BIOPSY  2015    US GUIDED THYROID BIOPSY [facility]     Social History     Tobacco Use    Smoking status: Every Day     Current packs/day: 1.00     Average packs/day: 1 pack/day for 41.5 years (41.5 ttl pk-yrs)     Types: Cigarettes     Start date:      Passive exposure: Current    Smokeless tobacco: Never   Substance Use Topics     Alcohol use: Not Currently     Social History     Substance and Sexual Activity   Drug Use Never     Patient Sexual activity questions deferred to the physician.    Family History[1]    Allergies   Allergen Reactions    Penicillin Unknown     Current Outpatient Medications   Medication Sig Dispense Refill    albuterol (ProAir HFA) 90 mcg/actuation inhaler Inhale 2 puffs every 6 hours. 18 g 3    atorvastatin (Lipitor) 80 mg tablet Take 1 tablet (80 mg) by mouth once daily. 90 tablet 3    Bifidobacterium infantis (ALIGN, B.INFANTIS, ORAL) Take 1 capsule by mouth once daily.      budesonide-formoteroL (Symbicort) 160-4.5 mcg/actuation inhaler Inhale 2 puffs 2 times a day. Rinse mouth with water after use to reduce aftertaste and incidence of candidiasis. Do not swallow. (Patient not taking: Reported on 7/10/2025) 10.2 g 5    calcium carbonate-vitamin D3 (Calcium 600 + D,3,) 600 mg-5 mcg (200 unit) capsule Take 1 tablet by mouth once every 24 hours. With a meal      chlorhexidine (Peridex) 0.12 % solution Use as directed. 473 mL 0    ezetimibe (Zetia) 10 mg tablet Take 1 tablet (10 mg) by mouth once daily. 90 tablet 3    levothyroxine (Synthroid, Levoxyl) 112 mcg tablet Take 1 tablet (112 mcg) by mouth once daily. 90 tablet 3    metoprolol succinate XL (Toprol-XL) 50 mg 24 hr tablet Take 1 tablet (50 mg) by mouth once daily. 90 tablet 3    MULTIVITAMIN ORAL Take 1 tablet by mouth once daily.      pantoprazole (ProtoNix) 40 mg EC tablet Take 1 tablet (40 mg) by mouth once daily.      psyllium (MetamuciL) 0.4 gram capsule Take 2 capsules by mouth once daily.      sucralfate (Carafate) 1 gram tablet Take 1 tablet (1 g) by mouth once daily.       No current facility-administered medications for this visit.     Review of Systems   Constitutional:  Positive for activity change.   HENT: Negative.     Eyes: Negative.    Respiratory:  Positive for shortness of breath (with stairs).    Cardiovascular:  Positive for leg  swelling (history of ankle swelling). Negative for palpitations.   Gastrointestinal:  Positive for abdominal pain (chronic lower abdominal burning pain) and constipation (history of constipation). Negative for diarrhea, nausea and vomiting.   Endocrine: Negative.    Genitourinary: Negative.    Musculoskeletal:  Positive for arthralgias and gait problem.        Right knee pain    Skin: Negative.         Small burn under left groin fold from heating pad   Hematological: Negative.    Psychiatric/Behavioral: Negative.       Physical Exam  Vitals reviewed.   Constitutional:       Appearance: She is morbidly obese.   HENT:      Head: Normocephalic and atraumatic.      Nose: Nose normal.      Mouth/Throat:      Mouth: Mucous membranes are moist.      Pharynx: Oropharynx is clear.   Eyes:      Extraocular Movements: Extraocular movements intact.      Conjunctiva/sclera: Conjunctivae normal.      Pupils: Pupils are equal, round, and reactive to light.   Cardiovascular:      Rate and Rhythm: Normal rate and regular rhythm.      Pulses: Normal pulses.      Heart sounds: Murmur heard.   Pulmonary:      Effort: Pulmonary effort is normal. No respiratory distress.      Breath sounds: Rhonchi (mild scattered rhonchi throughout lung field partially clears with cough.) present. No wheezing or rales.      Comments: No conversational dyspnea  Abdominal:      General: Bowel sounds are normal.      Palpations: Abdomen is soft.   Musculoskeletal:      Cervical back: Normal range of motion and neck supple.      Comments: Right knee pain with ROM.    Skin:     General: Skin is warm and dry.      Comments: Small quarter sized scab noted under left abdominal fold. No pain or drainage.    Neurological:      General: No focal deficit present.      Mental Status: She is alert and oriented to person, place, and time. Mental status is at baseline.   Psychiatric:         Mood and Affect: Mood normal.         Behavior: Behavior normal.          "Thought Content: Thought content normal.         Judgment: Judgment normal.        PAT AIRWAY:   Airway:     Mallampati::  III    TM distance::  >3 FB    Neck ROM::  Full  normal      Visit Vitals  /71   Pulse 78   Temp 36 °C (96.8 °F) (Temporal)   Resp 16   Ht 1.651 m (5' 5\")   Wt 114 kg (251 lb)   LMP 05/01/2016 (Approximate)   SpO2 95%   BMI 41.77 kg/m²   OB Status Postmenopausal   Smoking Status Every Day   BSA 2.29 m²     ASA: 3   CHADS: 2.8%  RCRI: 0.9%  Ariscat: 1.6%  DASI Risk Score      Flowsheet Row Pre-Admission Testing from 7/10/2025 in [facility]   Can you take care of yourself (eat, dress, bathe, or use toilet)?  2.75 filed at 07/10/2025 0922   Can you walk indoors, such as around your house? 1.75 filed at 07/10/2025 0922   Can you walk a block or two on level ground?  2.75 filed at 07/10/2025 0922   Can you climb a flight of stairs or walk up a hill? 5.5 filed at 07/10/2025 0922   Can you run a short distance? 0 filed at 07/10/2025 0922   Can you do light work around the house like dusting or washing dishes? 2.7 filed at 07/10/2025 0922   Can you do moderate work around the house like vacuuming, sweeping floors or carrying groceries? 3.5 filed at 07/10/2025 0922   Can you do heavy work around the house like scrubbing floors or lifting and moving heavy furniture?  0 filed at 07/10/2025 0922   Can you do yard work like raking leaves, weeding or pushing a mower? 4.5 filed at 07/10/2025 0922   Can you have sexual relations? 0 filed at 07/10/2025 0922   Can you participate in moderate recreational activities like golf, bowling, dancing, doubles tennis or throwing a baseball or football? 0 filed at 07/10/2025 0922   Can you participate in strenous sports like swimming, singles tennis, football, basketball, or skiing? 0 filed at 07/10/2025 0922   DASI SCORE 23.45 filed at 07/10/2025 0922   METS Score (Will be calculated only when all the questions are answered) 5.6 filed at 07/10/2025 " 0922          Caprini DVT Assessment    No data to display       Modified Frailty Index    No data to display       [id number] Stroke Risk Points  Current as of just now        N/A 0 to 9 Points:      Last Change: N/A          The [id number] risk score (Lip JAMES, et al. 2009. © 2010 [employer]) quantifies the risk of stroke for a patient with atrial fibrillation. For patients without atrial fibrillation or under the age of 18 this score appears as N/A. Higher score values generally indicate higher risk of stroke.        This score is not applicable to this patient. Components are not calculated.          Revised Cardiac Risk Index      Flowsheet Row Pre-Admission Testing from 7/10/2025 in [facility]   High-Risk Surgery (Intraperitoneal, Intrathoracic,Suprainguinal vascular) 1 filed at 07/10/2025 1002   History of ischemic heart disease (History of MI, History of positive exercuse test, Current chest paint considered due to myocardial ischemia, Use of nitrate therapy, ECG with pathological Q Waves) 0 filed at 07/10/2025 1002   History of congestive heart failure (pulmonary edemia, bilateral rales or S3 gallop, Paroxysmal nocturnal dyspnea, CXR showing pulmonary vascular redistribution) 0 filed at 07/10/2025 1002   History of cerebrovascular disease (Prior TIA or stroke) 0 filed at 07/10/2025 1002   Pre-operative insulin treatment 0 filed at 07/10/2025 1002   Pre-operative creatinine>2 mg/dl 0 filed at 07/10/2025 1002   Revised Cardiac Risk Calculator 1 filed at 07/10/2025 1002          Apfel Simplified Score    No data to display       Risk Analysis Index Results This Encounter    No data found in the last 10 encounters.       Stop Bang Score      Flowsheet Row Pre-Admission Testing from 7/10/2025 in [facility]   Do you snore loudly? 1 filed at 07/10/2025 0923   Do you often feel tired or fatigued after your sleep? 0 filed at 07/10/2025 0923   Has anyone  "ever observed you stop breathing in your sleep? 1  [when sleeping on back] filed at 07/10/2025 0923   Do you have or are you being treated for high blood pressure? 1 filed at 07/10/2025 0923   Recent BMI (Calculated) 41.8 filed at 07/10/2025 0923   Is BMI greater than 35 kg/m2? 1=Yes filed at 07/10/2025 0923   Age older than 50 years old? 1=Yes filed at 07/10/2025 0923   Is your neck circumference greater than 17 inches (Male) or 16 inches (Female)? 1 filed at 07/10/2025 0923   Gender - Male 0=No filed at 07/10/2025 0923   STOP-BANG Total Score 6 filed at 07/10/2025 0923          Prodigy: High Risk  Total Score: 8              Prodigy Age Score           ARISCAT Score for Postoperative Pulmonary Complications    No data to display       Jon Perioperative Risk for Myocardial Infarction or Cardiac Arrest (ESTEFANIA)    No data to display         Assessment and Plan:     Right knee pain, unspecified chronicity: Total Knee Replacement right.  HTN: Pt is taking metoprolol succinate XL.   Hyperlipidemia: Pt is taking atorvastatin and ezetimibe.   CAD: Per Dr Abdalla's last office note history of cardiac cath in 2017 \"ectatic arteries and nonobstructive coronary artery disease.\"  COPD: Denies inhaler use. Pt reports SOB with climbing stairs.   Moderate Aortic Valve stenosis: Dr Abdalla cardiac risk assessment below. Last ECHO in epic from 11/15/2022.   Prediabetic: 4/5/2025 HgbA1C: 6.   Hypothyroidism: history of total thyroidectomy d/t goiter. 4/5/2025 TSH: 1.59  History of vulvar high grade dysplasia: s/p wide local excision of vulva x3.   GERD: Pt is taking pantoprazole and sucralfate.   Diverticular disease: pt was treated with an antibiotic at the end of June for diverticulitis.   History of chronic lower abdominal/pelvic burning pain: Pt stated she has had this for several months. Pain relieves after having a BM. Pt's PCP is thinking about placing the patient on Linzess.   BMI: 41.77  Daily cigarette smoker    CBC and BMP " completed on 6/28/2025.  4/5/2025 HgbA1C and TSH completed.    EKG completed on 6/28/2025.       Media Information        Dr Abdalla cardiac risk assessment 5/16/2025:    To whom it may concern,   Leanne Suazo is a patient of mine with history of moderate valve stenosis, hypertension.  Patient scheduled to have knee replacement with Dr. Mercedes.  From cardiac standpoint patient is stable to have surgery at low risk overall.  Last seen she was asymptomatic with normal vitals.  Please do not hesitate to contact me if you have any additional questions.   Jodei Abdalla MD           Media Information        ECHO 11/15/2022:  CONCLUSIONS:  1. Left ventricular systolic function is normal with a 55-60% estimated ejection fraction.  2. Poorly visualized anatomical structures due to suboptimal image quality.  3. Spectral Doppler shows an impaired relaxation pattern of left ventricular diastolic filling.  4. RV normal function and size.  5. No pericardial effusion.  6. Mitral valve structurally normal.  7. Trace tricuspid regurgitation.  8. Aortic valve appears abnormal.  9. Mild calcific aortic stenosis MIO 1.8 cm2, V max 1.79 meters/seconds, p/m gradient 13/7 millimeters of mercury.    China Segundo, APRN-CNP           [1] No family history on file.

## 2025-07-10 NOTE — CPM/PAT H&P
CPM/PAT Evaluation       Name: Izzy Cantor (Izzy Cantor)  /Age: 1965/60 y.o.     In-Person       Chief Complaint: right knee pain     HPI    Pt is a 60 year old female with right knee pain. Pt reports she has experienced right knee pain over the past 8 years that continues to worsen. Pt stated she was managing her right knee pain with cortisone injections but is not ready to have surgery. Pt describes the pain as an aching pain that occasionally radiate to her right ankle. The pain worsens with prolonged walking and ROM. Pt has been taking Tylenol with only minimal relief from the pain. Pt stated at times her right knee feels unstable but she denies falls. Pt was examined by her surgeon and has been scheduled for right total knee replacement. Pt denies CP, SOB, or dizziness.     Past Medical History:   Diagnosis Date    Constipation     COPD (chronic obstructive pulmonary disease) (Multi)     Coronary artery disease     Diverticular disease     GERD (gastroesophageal reflux disease)     Goiter     History of vulvar dysplasia     Hyperlipidemia     Hypertension     Hypothyroidism     Moderate aortic valve stenosis     Nicotine dependence     Prediabetes     Stress incontinence        Past Surgical History:   Procedure Laterality Date    APPENDECTOMY      CARDIAC CATHETERIZATION      CHOLECYSTECTOMY      COLECTOMY PARTIAL / TOTAL      partial colectomy for hyperplastic polyp    COLONOSCOPY  2020    KNEE ARTHROPLASTY Left     OTHER SURGICAL HISTORY      wide local excision of vulva x 3    TOTAL THYROIDECTOMY      TUBAL LIGATION      US GUIDED THYROID BIOPSY  2015    US GUIDED THYROID BIOPSY [facility]     Social History     Tobacco Use    Smoking status: Every Day     Current packs/day: 1.00     Average packs/day: 1 pack/day for 41.5 years (41.5 ttl pk-yrs)     Types: Cigarettes     Start date:      Passive exposure: Current    Smokeless tobacco: Never   Substance Use Topics     Alcohol use: Not Currently     Social History     Substance and Sexual Activity   Drug Use Never     Patient Sexual activity questions deferred to the physician.    Family History[1]    Allergies   Allergen Reactions    Penicillin Unknown     Current Outpatient Medications   Medication Sig Dispense Refill    albuterol (ProAir HFA) 90 mcg/actuation inhaler Inhale 2 puffs every 6 hours. 18 g 3    atorvastatin (Lipitor) 80 mg tablet Take 1 tablet (80 mg) by mouth once daily. 90 tablet 3    Bifidobacterium infantis (ALIGN, B.INFANTIS, ORAL) Take 1 capsule by mouth once daily.      budesonide-formoteroL (Symbicort) 160-4.5 mcg/actuation inhaler Inhale 2 puffs 2 times a day. Rinse mouth with water after use to reduce aftertaste and incidence of candidiasis. Do not swallow. (Patient not taking: Reported on 7/10/2025) 10.2 g 5    calcium carbonate-vitamin D3 (Calcium 600 + D,3,) 600 mg-5 mcg (200 unit) capsule Take 1 tablet by mouth once every 24 hours. With a meal      chlorhexidine (Peridex) 0.12 % solution Use as directed. 473 mL 0    ezetimibe (Zetia) 10 mg tablet Take 1 tablet (10 mg) by mouth once daily. 90 tablet 3    levothyroxine (Synthroid, Levoxyl) 112 mcg tablet Take 1 tablet (112 mcg) by mouth once daily. 90 tablet 3    metoprolol succinate XL (Toprol-XL) 50 mg 24 hr tablet Take 1 tablet (50 mg) by mouth once daily. 90 tablet 3    MULTIVITAMIN ORAL Take 1 tablet by mouth once daily.      pantoprazole (ProtoNix) 40 mg EC tablet Take 1 tablet (40 mg) by mouth once daily.      psyllium (MetamuciL) 0.4 gram capsule Take 2 capsules by mouth once daily.      sucralfate (Carafate) 1 gram tablet Take 1 tablet (1 g) by mouth once daily.       No current facility-administered medications for this visit.     Review of Systems   Constitutional:  Positive for activity change.   HENT: Negative.     Eyes: Negative.    Respiratory:  Positive for shortness of breath (with stairs).    Cardiovascular:  Positive for leg  swelling (history of ankle swelling). Negative for palpitations.   Gastrointestinal:  Positive for abdominal pain (chronic lower abdominal burning pain) and constipation (history of constipation). Negative for diarrhea, nausea and vomiting.   Endocrine: Negative.    Genitourinary: Negative.    Musculoskeletal:  Positive for arthralgias and gait problem.        Right knee pain    Skin: Negative.         Small burn under left groin fold from heating pad   Hematological: Negative.    Psychiatric/Behavioral: Negative.       Physical Exam  Vitals reviewed.   Constitutional:       Appearance: She is morbidly obese.   HENT:      Head: Normocephalic and atraumatic.      Nose: Nose normal.      Mouth/Throat:      Mouth: Mucous membranes are moist.      Pharynx: Oropharynx is clear.   Eyes:      Extraocular Movements: Extraocular movements intact.      Conjunctiva/sclera: Conjunctivae normal.      Pupils: Pupils are equal, round, and reactive to light.   Cardiovascular:      Rate and Rhythm: Normal rate and regular rhythm.      Pulses: Normal pulses.      Heart sounds: Murmur heard.   Pulmonary:      Effort: Pulmonary effort is normal. No respiratory distress.      Breath sounds: Rhonchi (mild scattered rhonchi throughout lung field partially clears with cough.) present. No wheezing or rales.      Comments: No conversational dyspnea  Abdominal:      General: Bowel sounds are normal.      Palpations: Abdomen is soft.   Musculoskeletal:      Cervical back: Normal range of motion and neck supple.      Comments: Right knee pain with ROM.    Skin:     General: Skin is warm and dry.      Comments: Small quarter sized scab noted under left abdominal fold. No pain or drainage.    Neurological:      General: No focal deficit present.      Mental Status: She is alert and oriented to person, place, and time. Mental status is at baseline.   Psychiatric:         Mood and Affect: Mood normal.         Behavior: Behavior normal.          "Thought Content: Thought content normal.         Judgment: Judgment normal.        PAT AIRWAY:   Airway:     Mallampati::  III    TM distance::  >3 FB    Neck ROM::  Full  normal      Visit Vitals  /71   Pulse 78   Temp 36 °C (96.8 °F) (Temporal)   Resp 16   Ht 1.651 m (5' 5\")   Wt 114 kg (251 lb)   LMP 05/01/2016 (Approximate)   SpO2 95%   BMI 41.77 kg/m²   OB Status Postmenopausal   Smoking Status Every Day   BSA 2.29 m²     ASA: 3   CHADS: 2.8%  RCRI: 0.9%  Ariscat: 1.6%  DASI Risk Score      Flowsheet Row Pre-Admission Testing from 7/10/2025 in [facility]   Can you take care of yourself (eat, dress, bathe, or use toilet)?  2.75 filed at 07/10/2025 0922   Can you walk indoors, such as around your house? 1.75 filed at 07/10/2025 0922   Can you walk a block or two on level ground?  2.75 filed at 07/10/2025 0922   Can you climb a flight of stairs or walk up a hill? 5.5 filed at 07/10/2025 0922   Can you run a short distance? 0 filed at 07/10/2025 0922   Can you do light work around the house like dusting or washing dishes? 2.7 filed at 07/10/2025 0922   Can you do moderate work around the house like vacuuming, sweeping floors or carrying groceries? 3.5 filed at 07/10/2025 0922   Can you do heavy work around the house like scrubbing floors or lifting and moving heavy furniture?  0 filed at 07/10/2025 0922   Can you do yard work like raking leaves, weeding or pushing a mower? 4.5 filed at 07/10/2025 0922   Can you have sexual relations? 0 filed at 07/10/2025 0922   Can you participate in moderate recreational activities like golf, bowling, dancing, doubles tennis or throwing a baseball or football? 0 filed at 07/10/2025 0922   Can you participate in strenous sports like swimming, singles tennis, football, basketball, or skiing? 0 filed at 07/10/2025 0922   DASI SCORE 23.45 filed at 07/10/2025 0922   METS Score (Will be calculated only when all the questions are answered) 5.6 filed at 07/10/2025 " 0922          Caprini DVT Assessment    No data to display       Modified Frailty Index    No data to display       [id number] Stroke Risk Points  Current as of just now        N/A 0 to 9 Points:      Last Change: N/A          The [id number] risk score (Lip JAMES, et al. 2009. © 2010 American College [employer]) quantifies the risk of stroke for a patient with atrial fibrillation. For patients without atrial fibrillation or under the age of 18 this score appears as N/A. Higher score values generally indicate higher risk of stroke.        This score is not applicable to this patient. Components are not calculated.          Revised Cardiac Risk Index      Flowsheet Row Pre-Admission Testing from 7/10/2025 in [facility]   High-Risk Surgery (Intraperitoneal, Intrathoracic,Suprainguinal vascular) 1 filed at 07/10/2025 1002   History of ischemic heart disease (History of MI, History of positive exercuse test, Current chest paint considered due to myocardial ischemia, Use of nitrate therapy, ECG with pathological Q Waves) 0 filed at 07/10/2025 1002   History of congestive heart failure (pulmonary edemia, bilateral rales or S3 gallop, Paroxysmal nocturnal dyspnea, CXR showing pulmonary vascular redistribution) 0 filed at 07/10/2025 1002   History of cerebrovascular disease (Prior TIA or stroke) 0 filed at 07/10/2025 1002   Pre-operative insulin treatment 0 filed at 07/10/2025 1002   Pre-operative creatinine>2 mg/dl 0 filed at 07/10/2025 1002   Revised Cardiac Risk Calculator 1 filed at 07/10/2025 1002          Apfel Simplified Score    No data to display       Risk Analysis Index Results This Encounter    No data found in the last 10 encounters.       Stop Bang Score      Flowsheet Row Pre-Admission Testing from 7/10/2025 in [facility]   Do you snore loudly? 1 filed at 07/10/2025 0923   Do you often feel tired or fatigued after your sleep? 0 filed at 07/10/2025 0923   Has anyone  "ever observed you stop breathing in your sleep? 1  [when sleeping on back] filed at 07/10/2025 0923   Do you have or are you being treated for high blood pressure? 1 filed at 07/10/2025 0923   Recent BMI (Calculated) 41.8 filed at 07/10/2025 0923   Is BMI greater than 35 kg/m2? 1=Yes filed at 07/10/2025 0923   Age older than 50 years old? 1=Yes filed at 07/10/2025 0923   Is your neck circumference greater than 17 inches (Male) or 16 inches (Female)? 1 filed at 07/10/2025 0923   Gender - Male 0=No filed at 07/10/2025 0923   STOP-BANG Total Score 6 filed at 07/10/2025 0923          Prodigy: High Risk  Total Score: 8              Prodigy Age Score           ARISCAT Score for Postoperative Pulmonary Complications    No data to display       Jon Perioperative Risk for Myocardial Infarction or Cardiac Arrest (ESTEFANIA)    No data to display         Assessment and Plan:     Right knee pain, unspecified chronicity: Total Knee Replacement right.  HTN: Pt is taking metoprolol succinate XL.   Hyperlipidemia: Pt is taking atorvastatin and ezetimibe.   CAD: Per Dr Abdalla's last office note history of cardiac cath in 2017 \"ectatic arteries and nonobstructive coronary artery disease.\"  COPD: Denies inhaler use. Pt reports SOB with climbing stairs.   Moderate Aortic Valve stenosis: Dr Abdalla cardiac risk assessment below. Last ECHO in epic from 11/15/2022.   Prediabetic: 4/5/2025 HgbA1C: 6.   Hypothyroidism: history of total thyroidectomy d/t goiter. 4/5/2025 TSH: 1.59  History of vulvar high grade dysplasia: s/p wide local excision of vulva x3.   GERD: Pt is taking pantoprazole and sucralfate.   Diverticular disease: pt was treated with an antibiotic at the end of June for diverticulitis.   History of chronic lower abdominal/pelvic burning pain: Pt stated she has had this for several months. Pain relieves after having a BM. Pt's PCP is thinking about placing the patient on Linzess.   BMI: 41.77  Daily cigarette smoker    CBC and BMP " completed on 6/28/2025.  4/5/2025 HgbA1C and TSH completed.    EKG completed on 6/28/2025.       Media Information        Dr Abdalla cardiac risk assessment 5/16/2025:    To whom it may concern,   Leanne Suazo is a patient of mine with history of moderate valve stenosis, hypertension.  Patient scheduled to have knee replacement with Dr. Mercedes.  From cardiac standpoint patient is stable to have surgery at low risk overall.  Last seen she was asymptomatic with normal vitals.  Please do not hesitate to contact me if you have any additional questions.   Jodie Abdalla MD           Media Information        ECHO 11/15/2022:  CONCLUSIONS:  1. Left ventricular systolic function is normal with a 55-60% estimated ejection fraction.  2. Poorly visualized anatomical structures due to suboptimal image quality.  3. Spectral Doppler shows an impaired relaxation pattern of left ventricular diastolic filling.  4. RV normal function and size.  5. No pericardial effusion.  6. Mitral valve structurally normal.  7. Trace tricuspid regurgitation.  8. Aortic valve appears abnormal.  9. Mild calcific aortic stenosis MIO 1.8 cm2, V max 1.79 meters/seconds, p/m gradient 13/7 millimeters of mercury.    China Segundo, APRN-CNP           [1] No family history on file.

## 2025-07-12 LAB — STAPHYLOCOCCUS SPEC CULT: NORMAL

## 2025-07-16 ENCOUNTER — HOSPITAL ENCOUNTER (OUTPATIENT)
Dept: RADIOLOGY | Facility: CLINIC | Age: 60
End: 2025-07-16
Payer: COMMERCIAL

## 2025-07-24 ENCOUNTER — ANESTHESIA (OUTPATIENT)
Dept: OPERATING ROOM | Facility: HOSPITAL | Age: 60
End: 2025-07-24
Payer: COMMERCIAL

## 2025-07-24 ENCOUNTER — PHARMACY VISIT (OUTPATIENT)
Dept: PHARMACY | Facility: CLINIC | Age: 60
End: 2025-07-24
Payer: MEDICAID

## 2025-07-24 ENCOUNTER — HOSPITAL ENCOUNTER (OUTPATIENT)
Facility: HOSPITAL | Age: 60
Setting detail: OUTPATIENT SURGERY
Discharge: HOME HEALTH | End: 2025-07-24
Attending: ORTHOPAEDIC SURGERY | Admitting: ORTHOPAEDIC SURGERY
Payer: COMMERCIAL

## 2025-07-24 ENCOUNTER — DOCUMENTATION (OUTPATIENT)
Dept: HOME HEALTH SERVICES | Facility: HOME HEALTH | Age: 60
End: 2025-07-24

## 2025-07-24 ENCOUNTER — HOME HEALTH ADMISSION (OUTPATIENT)
Dept: HOME HEALTH SERVICES | Facility: HOME HEALTH | Age: 60
End: 2025-07-24
Payer: COMMERCIAL

## 2025-07-24 ENCOUNTER — ANESTHESIA EVENT (OUTPATIENT)
Dept: OPERATING ROOM | Facility: HOSPITAL | Age: 60
End: 2025-07-24
Payer: COMMERCIAL

## 2025-07-24 ENCOUNTER — APPOINTMENT (OUTPATIENT)
Dept: RADIOLOGY | Facility: HOSPITAL | Age: 60
End: 2025-07-24
Payer: COMMERCIAL

## 2025-07-24 VITALS
BODY MASS INDEX: 41.6 KG/M2 | DIASTOLIC BLOOD PRESSURE: 71 MMHG | WEIGHT: 250 LBS | HEART RATE: 83 BPM | SYSTOLIC BLOOD PRESSURE: 135 MMHG | TEMPERATURE: 97.5 F | RESPIRATION RATE: 18 BRPM | OXYGEN SATURATION: 96 %

## 2025-07-24 DIAGNOSIS — M17.11 ARTHRITIS OF KNEE, RIGHT: ICD-10-CM

## 2025-07-24 DIAGNOSIS — M25.561 RIGHT KNEE PAIN, UNSPECIFIED CHRONICITY: Primary | ICD-10-CM

## 2025-07-24 LAB — GLUCOSE BLD MANUAL STRIP-MCNC: 103 MG/DL (ref 74–99)

## 2025-07-24 PROCEDURE — 2500000001 HC RX 250 WO HCPCS SELF ADMINISTERED DRUGS (ALT 637 FOR MEDICARE OP): Performed by: ORTHOPAEDIC SURGERY

## 2025-07-24 PROCEDURE — 2500000004 HC RX 250 GENERAL PHARMACY W/ HCPCS (ALT 636 FOR OP/ED): Mod: JW | Performed by: NURSE ANESTHETIST, CERTIFIED REGISTERED

## 2025-07-24 PROCEDURE — C1776 JOINT DEVICE (IMPLANTABLE): HCPCS | Performed by: ORTHOPAEDIC SURGERY

## 2025-07-24 PROCEDURE — 3600000017 HC OR TIME - EACH INCREMENTAL 1 MINUTE - PROCEDURE LEVEL SIX: Performed by: ORTHOPAEDIC SURGERY

## 2025-07-24 PROCEDURE — 88311 DECALCIFY TISSUE: CPT | Mod: TC,TRILAB,WESLAB | Performed by: ORTHOPAEDIC SURGERY

## 2025-07-24 PROCEDURE — 73560 X-RAY EXAM OF KNEE 1 OR 2: CPT | Mod: RIGHT SIDE | Performed by: RADIOLOGY

## 2025-07-24 PROCEDURE — A9999 DME SUPPLY OR ACCESSORY, NOS: HCPCS | Performed by: ORTHOPAEDIC SURGERY

## 2025-07-24 PROCEDURE — 7100000010 HC PHASE TWO TIME - EACH INCREMENTAL 1 MINUTE: Performed by: ORTHOPAEDIC SURGERY

## 2025-07-24 PROCEDURE — 2500000002 HC RX 250 W HCPCS SELF ADMINISTERED DRUGS (ALT 637 FOR MEDICARE OP, ALT 636 FOR OP/ED): Performed by: ANESTHESIOLOGY

## 2025-07-24 PROCEDURE — 7100000001 HC RECOVERY ROOM TIME - INITIAL BASE CHARGE: Performed by: ORTHOPAEDIC SURGERY

## 2025-07-24 PROCEDURE — 2500000004 HC RX 250 GENERAL PHARMACY W/ HCPCS (ALT 636 FOR OP/ED): Performed by: ORTHOPAEDIC SURGERY

## 2025-07-24 PROCEDURE — 88305 TISSUE EXAM BY PATHOLOGIST: CPT | Performed by: STUDENT IN AN ORGANIZED HEALTH CARE EDUCATION/TRAINING PROGRAM

## 2025-07-24 PROCEDURE — 3600000018 HC OR TIME - INITIAL BASE CHARGE - PROCEDURE LEVEL SIX: Performed by: ORTHOPAEDIC SURGERY

## 2025-07-24 PROCEDURE — 64473 LWR XTR FSCL PLN BLK UNI NJX: CPT | Performed by: ANESTHESIOLOGY

## 2025-07-24 PROCEDURE — 7100000009 HC PHASE TWO TIME - INITIAL BASE CHARGE: Performed by: ORTHOPAEDIC SURGERY

## 2025-07-24 PROCEDURE — 2500000004 HC RX 250 GENERAL PHARMACY W/ HCPCS (ALT 636 FOR OP/ED): Performed by: ANESTHESIOLOGY

## 2025-07-24 PROCEDURE — 97116 GAIT TRAINING THERAPY: CPT | Mod: GP

## 2025-07-24 PROCEDURE — 73560 X-RAY EXAM OF KNEE 1 OR 2: CPT | Mod: RT

## 2025-07-24 PROCEDURE — 97161 PT EVAL LOW COMPLEX 20 MIN: CPT | Mod: GP

## 2025-07-24 PROCEDURE — 2500000005 HC RX 250 GENERAL PHARMACY W/O HCPCS: Performed by: NURSE ANESTHETIST, CERTIFIED REGISTERED

## 2025-07-24 PROCEDURE — 27447 TOTAL KNEE ARTHROPLASTY: CPT | Performed by: ORTHOPAEDIC SURGERY

## 2025-07-24 PROCEDURE — 7100000002 HC RECOVERY ROOM TIME - EACH INCREMENTAL 1 MINUTE: Performed by: ORTHOPAEDIC SURGERY

## 2025-07-24 PROCEDURE — 2780000003 HC OR 278 NO HCPCS: Performed by: ORTHOPAEDIC SURGERY

## 2025-07-24 PROCEDURE — RXMED WILLOW AMBULATORY MEDICATION CHARGE

## 2025-07-24 PROCEDURE — A4649 SURGICAL SUPPLIES: HCPCS | Performed by: ORTHOPAEDIC SURGERY

## 2025-07-24 PROCEDURE — 64447 NJX AA&/STRD FEMORAL NRV IMG: CPT | Performed by: ANESTHESIOLOGY

## 2025-07-24 PROCEDURE — 82947 ASSAY GLUCOSE BLOOD QUANT: CPT

## 2025-07-24 PROCEDURE — 3700000001 HC GENERAL ANESTHESIA TIME - INITIAL BASE CHARGE: Performed by: ORTHOPAEDIC SURGERY

## 2025-07-24 PROCEDURE — 27447 TOTAL KNEE ARTHROPLASTY: CPT

## 2025-07-24 PROCEDURE — 88311 DECALCIFY TISSUE: CPT | Performed by: STUDENT IN AN ORGANIZED HEALTH CARE EDUCATION/TRAINING PROGRAM

## 2025-07-24 PROCEDURE — 7100000011 HC EXTENDED STAY RECOVERY HOURLY - NURSING UNIT

## 2025-07-24 PROCEDURE — 3700000002 HC GENERAL ANESTHESIA TIME - EACH INCREMENTAL 1 MINUTE: Performed by: ORTHOPAEDIC SURGERY

## 2025-07-24 PROCEDURE — 2720000007 HC OR 272 NO HCPCS: Performed by: ORTHOPAEDIC SURGERY

## 2025-07-24 PROCEDURE — A27447 PR TOTAL KNEE ARTHROPLASTY: Performed by: NURSE ANESTHETIST, CERTIFIED REGISTERED

## 2025-07-24 PROCEDURE — A27447 PR TOTAL KNEE ARTHROPLASTY: Performed by: ANESTHESIOLOGY

## 2025-07-24 PROCEDURE — 2500000002 HC RX 250 W HCPCS SELF ADMINISTERED DRUGS (ALT 637 FOR MEDICARE OP, ALT 636 FOR OP/ED): Performed by: NURSE ANESTHETIST, CERTIFIED REGISTERED

## 2025-07-24 PROCEDURE — 2500000005 HC RX 250 GENERAL PHARMACY W/O HCPCS: Performed by: ORTHOPAEDIC SURGERY

## 2025-07-24 DEVICE — IMPLANTABLE DEVICE: Type: IMPLANTABLE DEVICE | Site: KNEE | Status: FUNCTIONAL

## 2025-07-24 RX ORDER — NAPROXEN 500 MG/1
500 TABLET ORAL 2 TIMES DAILY
Qty: 28 TABLET | Refills: 0 | Status: SHIPPED | OUTPATIENT
Start: 2025-07-24

## 2025-07-24 RX ORDER — HYDROMORPHONE HYDROCHLORIDE 1 MG/ML
INJECTION, SOLUTION INTRAMUSCULAR; INTRAVENOUS; SUBCUTANEOUS AS NEEDED
Status: DISCONTINUED | OUTPATIENT
Start: 2025-07-24 | End: 2025-07-24

## 2025-07-24 RX ORDER — POLYETHYLENE GLYCOL 3350 17 G/17G
17 POWDER, FOR SOLUTION ORAL DAILY
Status: CANCELLED | OUTPATIENT
Start: 2025-07-24

## 2025-07-24 RX ORDER — EZETIMIBE 10 MG/1
10 TABLET ORAL DAILY
Status: DISCONTINUED | OUTPATIENT
Start: 2025-07-24 | End: 2025-07-24 | Stop reason: HOSPADM

## 2025-07-24 RX ORDER — ONDANSETRON HYDROCHLORIDE 2 MG/ML
4 INJECTION, SOLUTION INTRAVENOUS EVERY 8 HOURS PRN
Status: CANCELLED | OUTPATIENT
Start: 2025-07-24

## 2025-07-24 RX ORDER — OXYCODONE AND ACETAMINOPHEN 5; 325 MG/1; MG/1
0.5 TABLET ORAL EVERY 4 HOURS PRN
Refills: 0 | Status: CANCELLED | OUTPATIENT
Start: 2025-07-24

## 2025-07-24 RX ORDER — MEPERIDINE HYDROCHLORIDE 25 MG/ML
12.5 INJECTION INTRAMUSCULAR; INTRAVENOUS; SUBCUTANEOUS EVERY 10 MIN PRN
Status: DISCONTINUED | OUTPATIENT
Start: 2025-07-24 | End: 2025-07-24 | Stop reason: HOSPADM

## 2025-07-24 RX ORDER — TRANEXAMIC ACID 1 G/10ML
INJECTION, SOLUTION INTRAVENOUS AS NEEDED
Status: DISCONTINUED | OUTPATIENT
Start: 2025-07-24 | End: 2025-07-24

## 2025-07-24 RX ORDER — DEXMEDETOMIDINE IN 0.9 % NACL 20 MCG/5ML
SYRINGE (ML) INTRAVENOUS AS NEEDED
Status: DISCONTINUED | OUTPATIENT
Start: 2025-07-24 | End: 2025-07-24

## 2025-07-24 RX ORDER — ACETAMINOPHEN 325 MG/1
975 TABLET ORAL ONCE
Status: DISCONTINUED | OUTPATIENT
Start: 2025-07-24 | End: 2025-07-24 | Stop reason: HOSPADM

## 2025-07-24 RX ORDER — CEFAZOLIN SODIUM 2 G/100ML
2 INJECTION, SOLUTION INTRAVENOUS EVERY 8 HOURS
Status: CANCELLED | OUTPATIENT
Start: 2025-07-24 | End: 2025-07-25

## 2025-07-24 RX ORDER — KETOROLAC TROMETHAMINE 30 MG/ML
30 INJECTION, SOLUTION INTRAMUSCULAR; INTRAVENOUS EVERY 6 HOURS
Status: CANCELLED | OUTPATIENT
Start: 2025-07-24 | End: 2025-07-25

## 2025-07-24 RX ORDER — FENTANYL CITRATE 50 UG/ML
50 INJECTION, SOLUTION INTRAMUSCULAR; INTRAVENOUS EVERY 5 MIN PRN
Status: DISCONTINUED | OUTPATIENT
Start: 2025-07-24 | End: 2025-07-24 | Stop reason: HOSPADM

## 2025-07-24 RX ORDER — OXYCODONE AND ACETAMINOPHEN 10; 325 MG/1; MG/1
1 TABLET ORAL EVERY 4 HOURS PRN
Status: DISCONTINUED | OUTPATIENT
Start: 2025-07-24 | End: 2025-07-24 | Stop reason: HOSPADM

## 2025-07-24 RX ORDER — ONDANSETRON HYDROCHLORIDE 2 MG/ML
4 INJECTION, SOLUTION INTRAVENOUS ONCE AS NEEDED
Status: DISCONTINUED | OUTPATIENT
Start: 2025-07-24 | End: 2025-07-24 | Stop reason: HOSPADM

## 2025-07-24 RX ORDER — CYCLOBENZAPRINE HCL 10 MG
10 TABLET ORAL 3 TIMES DAILY PRN
Status: CANCELLED | OUTPATIENT
Start: 2025-07-24

## 2025-07-24 RX ORDER — CEFADROXIL 500 MG/1
500 CAPSULE ORAL 2 TIMES DAILY
Qty: 14 CAPSULE | Refills: 0 | Status: SHIPPED | OUTPATIENT
Start: 2025-07-24

## 2025-07-24 RX ORDER — ALBUTEROL SULFATE 0.83 MG/ML
2.5 SOLUTION RESPIRATORY (INHALATION) ONCE AS NEEDED
Status: COMPLETED | OUTPATIENT
Start: 2025-07-24 | End: 2025-07-24

## 2025-07-24 RX ORDER — SODIUM CHLORIDE, SODIUM LACTATE, POTASSIUM CHLORIDE, CALCIUM CHLORIDE 600; 310; 30; 20 MG/100ML; MG/100ML; MG/100ML; MG/100ML
INJECTION, SOLUTION INTRAVENOUS CONTINUOUS PRN
Status: DISCONTINUED | OUTPATIENT
Start: 2025-07-24 | End: 2025-07-24

## 2025-07-24 RX ORDER — CELECOXIB 200 MG/1
400 CAPSULE ORAL ONCE
Status: COMPLETED | OUTPATIENT
Start: 2025-07-24 | End: 2025-07-24

## 2025-07-24 RX ORDER — LIDOCAINE HYDROCHLORIDE 10 MG/ML
0.1 INJECTION, SOLUTION INFILTRATION; PERINEURAL ONCE
Status: DISCONTINUED | OUTPATIENT
Start: 2025-07-24 | End: 2025-07-24 | Stop reason: HOSPADM

## 2025-07-24 RX ORDER — OXYCODONE HYDROCHLORIDE 5 MG/1
5 TABLET ORAL EVERY 4 HOURS PRN
Status: DISCONTINUED | OUTPATIENT
Start: 2025-07-24 | End: 2025-07-24 | Stop reason: HOSPADM

## 2025-07-24 RX ORDER — PROPOFOL 10 MG/ML
INJECTION, EMULSION INTRAVENOUS AS NEEDED
Status: DISCONTINUED | OUTPATIENT
Start: 2025-07-24 | End: 2025-07-24

## 2025-07-24 RX ORDER — NALOXONE HYDROCHLORIDE 0.4 MG/ML
0.2 INJECTION, SOLUTION INTRAMUSCULAR; INTRAVENOUS; SUBCUTANEOUS EVERY 5 MIN PRN
Status: CANCELLED | OUTPATIENT
Start: 2025-07-24

## 2025-07-24 RX ORDER — FENTANYL CITRATE 50 UG/ML
25 INJECTION, SOLUTION INTRAMUSCULAR; INTRAVENOUS EVERY 5 MIN PRN
Status: DISCONTINUED | OUTPATIENT
Start: 2025-07-24 | End: 2025-07-24 | Stop reason: HOSPADM

## 2025-07-24 RX ORDER — ACETAMINOPHEN 325 MG/1
650 TABLET ORAL EVERY 6 HOURS SCHEDULED
Status: CANCELLED | OUTPATIENT
Start: 2025-07-24

## 2025-07-24 RX ORDER — METOPROLOL SUCCINATE 50 MG/1
50 TABLET, EXTENDED RELEASE ORAL DAILY
Status: DISCONTINUED | OUTPATIENT
Start: 2025-07-25 | End: 2025-07-24 | Stop reason: HOSPADM

## 2025-07-24 RX ORDER — ONDANSETRON 4 MG/1
4 TABLET, FILM COATED ORAL EVERY 8 HOURS PRN
Status: CANCELLED | OUTPATIENT
Start: 2025-07-24

## 2025-07-24 RX ORDER — ASPIRIN 81 MG/1
81 TABLET ORAL 2 TIMES DAILY
Status: CANCELLED | OUTPATIENT
Start: 2025-07-25

## 2025-07-24 RX ORDER — FENTANYL CITRATE 50 UG/ML
INJECTION, SOLUTION INTRAMUSCULAR; INTRAVENOUS AS NEEDED
Status: DISCONTINUED | OUTPATIENT
Start: 2025-07-24 | End: 2025-07-24

## 2025-07-24 RX ORDER — OXYCODONE AND ACETAMINOPHEN 5; 325 MG/1; MG/1
1 TABLET ORAL EVERY 4 HOURS PRN
Qty: 36 TABLET | Refills: 0 | Status: SHIPPED | OUTPATIENT
Start: 2025-07-24 | End: 2025-07-28 | Stop reason: SDUPTHER

## 2025-07-24 RX ORDER — OXYCODONE HYDROCHLORIDE 5 MG/1
5 TABLET ORAL EVERY 6 HOURS PRN
Refills: 0 | Status: CANCELLED | OUTPATIENT
Start: 2025-07-24

## 2025-07-24 RX ORDER — ASPIRIN 81 MG/1
81 TABLET ORAL 2 TIMES DAILY
Qty: 60 TABLET | Refills: 0 | Status: SHIPPED | OUTPATIENT
Start: 2025-07-24

## 2025-07-24 RX ORDER — LEVOTHYROXINE SODIUM 112 UG/1
112 TABLET ORAL DAILY
Status: DISCONTINUED | OUTPATIENT
Start: 2025-07-25 | End: 2025-07-24 | Stop reason: HOSPADM

## 2025-07-24 RX ORDER — ROPIVACAINE HYDROCHLORIDE 5 MG/ML
INJECTION, SOLUTION EPIDURAL; INFILTRATION; PERINEURAL
Status: COMPLETED | OUTPATIENT
Start: 2025-07-24 | End: 2025-07-24

## 2025-07-24 RX ORDER — MORPHINE SULFATE 2 MG/ML
2 INJECTION, SOLUTION INTRAMUSCULAR; INTRAVENOUS EVERY 2 HOUR PRN
Status: CANCELLED | OUTPATIENT
Start: 2025-07-24

## 2025-07-24 RX ORDER — ONDANSETRON HYDROCHLORIDE 2 MG/ML
INJECTION, SOLUTION INTRAVENOUS AS NEEDED
Status: DISCONTINUED | OUTPATIENT
Start: 2025-07-24 | End: 2025-07-24

## 2025-07-24 RX ORDER — ACETAMINOPHEN 500 MG
1000 TABLET ORAL EVERY 6 HOURS PRN
COMMUNITY

## 2025-07-24 RX ORDER — FENTANYL CITRATE 50 UG/ML
50 INJECTION, SOLUTION INTRAMUSCULAR; INTRAVENOUS ONCE
Status: COMPLETED | OUTPATIENT
Start: 2025-07-24 | End: 2025-07-24

## 2025-07-24 RX ORDER — ATORVASTATIN CALCIUM 80 MG/1
80 TABLET, FILM COATED ORAL DAILY
Status: DISCONTINUED | OUTPATIENT
Start: 2025-07-24 | End: 2025-07-24 | Stop reason: HOSPADM

## 2025-07-24 RX ORDER — LABETALOL HYDROCHLORIDE 5 MG/ML
5 INJECTION, SOLUTION INTRAVENOUS ONCE AS NEEDED
Status: DISCONTINUED | OUTPATIENT
Start: 2025-07-24 | End: 2025-07-24 | Stop reason: HOSPADM

## 2025-07-24 RX ORDER — ROPIVACAINE/EPI/CLONIDINE/KET 2.46-0.005
50 SYRINGE (ML) INJECTION ONCE
Status: CANCELLED | OUTPATIENT
Start: 2025-07-24 | End: 2025-07-24

## 2025-07-24 RX ORDER — SODIUM CHLORIDE, SODIUM LACTATE, POTASSIUM CHLORIDE, CALCIUM CHLORIDE 600; 310; 30; 20 MG/100ML; MG/100ML; MG/100ML; MG/100ML
100 INJECTION, SOLUTION INTRAVENOUS CONTINUOUS
Status: CANCELLED | OUTPATIENT
Start: 2025-07-24 | End: 2025-07-25

## 2025-07-24 RX ORDER — MIDAZOLAM HYDROCHLORIDE 1 MG/ML
2 INJECTION, SOLUTION INTRAMUSCULAR; INTRAVENOUS ONCE
Status: COMPLETED | OUTPATIENT
Start: 2025-07-24 | End: 2025-07-24

## 2025-07-24 RX ORDER — CEFAZOLIN SODIUM 2 G/100ML
2 INJECTION, SOLUTION INTRAVENOUS ONCE
Status: COMPLETED | OUTPATIENT
Start: 2025-07-24 | End: 2025-07-24

## 2025-07-24 RX ORDER — IPRATROPIUM BROMIDE AND ALBUTEROL SULFATE 2.5; .5 MG/3ML; MG/3ML
SOLUTION RESPIRATORY (INHALATION) AS NEEDED
Status: DISCONTINUED | OUTPATIENT
Start: 2025-07-24 | End: 2025-07-24

## 2025-07-24 RX ORDER — DOCUSATE SODIUM 100 MG/1
100 CAPSULE, LIQUID FILLED ORAL 2 TIMES DAILY PRN
Qty: 25 CAPSULE | Refills: 0 | Status: SHIPPED | OUTPATIENT
Start: 2025-07-24

## 2025-07-24 RX ORDER — ROPIVACAINE/EPI/CLONIDINE/KET 2.46-0.005
50 SYRINGE (ML) INJECTION ONCE
Status: COMPLETED | OUTPATIENT
Start: 2025-07-24 | End: 2025-07-24

## 2025-07-24 RX ORDER — ONDANSETRON 4 MG/1
4 TABLET, FILM COATED ORAL EVERY 8 HOURS PRN
Qty: 20 TABLET | Refills: 0 | Status: SHIPPED | OUTPATIENT
Start: 2025-07-24

## 2025-07-24 RX ADMIN — FENTANYL CITRATE 50 MCG: 50 INJECTION INTRAMUSCULAR; INTRAVENOUS at 13:21

## 2025-07-24 RX ADMIN — FENTANYL CITRATE 25 MCG: 50 INJECTION, SOLUTION INTRAMUSCULAR; INTRAVENOUS at 10:57

## 2025-07-24 RX ADMIN — CEFAZOLIN SODIUM 2 G: 2 INJECTION, SOLUTION INTRAVENOUS at 11:01

## 2025-07-24 RX ADMIN — DEXAMETHASONE SODIUM PHOSPHATE 4 MG: 4 INJECTION, SOLUTION INTRAMUSCULAR; INTRAVENOUS at 11:15

## 2025-07-24 RX ADMIN — TRANEXAMIC ACID 1000 MG: 1 INJECTION, SOLUTION INTRAVENOUS at 11:01

## 2025-07-24 RX ADMIN — TRANEXAMIC ACID 1000 MG: 1 INJECTION, SOLUTION INTRAVENOUS at 12:30

## 2025-07-24 RX ADMIN — ROPIVACAINE HYDROCHLORIDE 20 ML: 5 INJECTION, SOLUTION EPIDURAL; INFILTRATION; PERINEURAL at 10:15

## 2025-07-24 RX ADMIN — ROPIVACAINE HYDROCHLORIDE 10 ML: 5 INJECTION, SOLUTION EPIDURAL; INFILTRATION; PERINEURAL at 10:17

## 2025-07-24 RX ADMIN — FENTANYL CITRATE 25 MCG: 50 INJECTION, SOLUTION INTRAMUSCULAR; INTRAVENOUS at 11:13

## 2025-07-24 RX ADMIN — Medication 20 MG: at 10:47

## 2025-07-24 RX ADMIN — FENTANYL CITRATE 50 MCG: 50 INJECTION INTRAMUSCULAR; INTRAVENOUS at 10:12

## 2025-07-24 RX ADMIN — POVIDONE-IODINE 1 APPLICATION: 5 SOLUTION TOPICAL at 09:17

## 2025-07-24 RX ADMIN — HYDROMORPHONE HYDROCHLORIDE 0.25 MG: 1 INJECTION, SOLUTION INTRAMUSCULAR; INTRAVENOUS; SUBCUTANEOUS at 12:02

## 2025-07-24 RX ADMIN — MIDAZOLAM HYDROCHLORIDE 2 MG: 1 INJECTION, SOLUTION INTRAMUSCULAR; INTRAVENOUS at 10:12

## 2025-07-24 RX ADMIN — ONDANSETRON 4 MG: 2 INJECTION, SOLUTION INTRAMUSCULAR; INTRAVENOUS at 11:01

## 2025-07-24 RX ADMIN — HYDROMORPHONE HYDROCHLORIDE 0.25 MG: 1 INJECTION, SOLUTION INTRAMUSCULAR; INTRAVENOUS; SUBCUTANEOUS at 11:39

## 2025-07-24 RX ADMIN — SODIUM CHLORIDE, POTASSIUM CHLORIDE, SODIUM LACTATE AND CALCIUM CHLORIDE: 600; 310; 30; 20 INJECTION, SOLUTION INTRAVENOUS at 10:42

## 2025-07-24 RX ADMIN — PROPOFOL 200 MG: 10 INJECTION, EMULSION INTRAVENOUS at 10:47

## 2025-07-24 RX ADMIN — OXYCODONE AND ACETAMINOPHEN 1 TABLET: 10; 325 TABLET ORAL at 15:13

## 2025-07-24 RX ADMIN — Medication 4 MCG: at 10:47

## 2025-07-24 RX ADMIN — FENTANYL CITRATE 50 MCG: 50 INJECTION INTRAMUSCULAR; INTRAVENOUS at 13:04

## 2025-07-24 RX ADMIN — IPRATROPIUM BROMIDE AND ALBUTEROL SULFATE 2.5 MG: .5; 3 SOLUTION RESPIRATORY (INHALATION) at 10:30

## 2025-07-24 RX ADMIN — PROPOFOL 100 MCG/KG/MIN: 10 INJECTION, EMULSION INTRAVENOUS at 10:53

## 2025-07-24 RX ADMIN — CELECOXIB 400 MG: 200 CAPSULE ORAL at 09:11

## 2025-07-24 RX ADMIN — ALBUTEROL SULFATE 2.5 MG: 2.5 SOLUTION RESPIRATORY (INHALATION) at 12:50

## 2025-07-24 RX ADMIN — FENTANYL CITRATE 50 MCG: 50 INJECTION INTRAMUSCULAR; INTRAVENOUS at 12:55

## 2025-07-24 RX ADMIN — HYDROMORPHONE HYDROCHLORIDE 0.25 MG: 1 INJECTION, SOLUTION INTRAMUSCULAR; INTRAVENOUS; SUBCUTANEOUS at 11:25

## 2025-07-24 RX ADMIN — HYDROMORPHONE HYDROCHLORIDE 0.25 MG: 1 INJECTION, SOLUTION INTRAMUSCULAR; INTRAVENOUS; SUBCUTANEOUS at 11:53

## 2025-07-24 RX ADMIN — FENTANYL CITRATE 50 MCG: 50 INJECTION, SOLUTION INTRAMUSCULAR; INTRAVENOUS at 11:17

## 2025-07-24 ASSESSMENT — PAIN SCALES - GENERAL
PAINLEVEL_OUTOF10: 0 - NO PAIN
PAINLEVEL_OUTOF10: 6
PAINLEVEL_OUTOF10: 8
PAINLEVEL_OUTOF10: 7
PAINLEVEL_OUTOF10: 6
PAINLEVEL_OUTOF10: 7
PAINLEVEL_OUTOF10: 6
PAINLEVEL_OUTOF10: 8
PAINLEVEL_OUTOF10: 7
PAINLEVEL_OUTOF10: 8

## 2025-07-24 ASSESSMENT — COGNITIVE AND FUNCTIONAL STATUS - GENERAL
CLIMB 3 TO 5 STEPS WITH RAILING: A LITTLE
MOVING FROM LYING ON BACK TO SITTING ON SIDE OF FLAT BED WITH BEDRAILS: A LITTLE
MOVING TO AND FROM BED TO CHAIR: A LITTLE
WALKING IN HOSPITAL ROOM: A LITTLE
STANDING UP FROM CHAIR USING ARMS: A LITTLE
TURNING FROM BACK TO SIDE WHILE IN FLAT BAD: A LITTLE
MOBILITY SCORE: 18

## 2025-07-24 ASSESSMENT — PAIN DESCRIPTION - ORIENTATION
ORIENTATION: RIGHT

## 2025-07-24 ASSESSMENT — PAIN DESCRIPTION - DESCRIPTORS
DESCRIPTORS: SORE
DESCRIPTORS: SORE;ACHING
DESCRIPTORS: SORE

## 2025-07-24 ASSESSMENT — PAIN - FUNCTIONAL ASSESSMENT
PAIN_FUNCTIONAL_ASSESSMENT: 0-10

## 2025-07-24 ASSESSMENT — PAIN DESCRIPTION - LOCATION
LOCATION: KNEE

## 2025-07-24 ASSESSMENT — ACTIVITIES OF DAILY LIVING (ADL): ADL_ASSISTANCE: INDEPENDENT

## 2025-07-24 NOTE — HH CARE COORDINATION
Home Care received a Referral for Physical Therapy. We have processed the referral for a Start of Care 7/25/2025 .     If you have any questions or concerns, please feel free to contact us at [phone]. Follow the prompts, enter your five digit zip code, and you will be directed to your care team on E[facility] 1.

## 2025-07-24 NOTE — ANESTHESIA PROCEDURE NOTES
Airway  Date/Time: 7/24/2025 10:56 AM  Reason: elective    Airway not difficult    Staffing  Performed: CRNA   Authorized by: Aaron Garcia MD    Performed by: KENRICK Guzman-DOROTHY  Patient location during procedure: OR    Patient Condition  Indications for airway management: anesthesia  Patient position: sniffing  Planned trial extubation  Sedation level: no sedation     Final Airway Details   Preoxygenated: yes  Final airway type: supraglottic airway  Successful airway:   Size: 4  Number of attempts at approach: 1  Number of other approaches attempted: 0    Additional Comments  Ambu LMA. Pt had smooth insertion of LMA, Pt had some low tv and coughing after lma and then settled down and was good. VSS

## 2025-07-24 NOTE — PROGRESS NOTES
Physical Therapy Evaluation & Treatment    Patient Name: Izzy Cantor  MRN: [id number]  Department:   Room: [facility] OR  Today's Date: 7/24/2025   Time Calculation  Start Time: 1547  Stop Time: 1617  Time Calculation (min): 30 min    Assessment/Plan   PT Assessment  PT Assessment Results: Decreased strength, Decreased range of motion, Decreased endurance, Impaired balance, Decreased mobility, Decreased coordination, Decreased cognition, Impaired judgement, Decreased safety awareness, Decreased skin integrity, Orthopedic restrictions, Pain  Rehab Prognosis: Good  Barriers to Discharge Home: No anticipated barriers  Evaluation/Treatment Tolerance: Patient tolerated treatment well  Medical Staff Made Aware: Yes  Strengths: Premorbid level of function  Barriers to Participation: Comorbidities  End of Session Communication: Bedside nurse  Assessment Comment: 60 year old female admits sp R TKA compleed by Dr. Mercedes. WBAT post op with knee precautions. On eval, she demonstates impaired safe mobility warranting skilled PT care. At DC, low intensity rehab is recommended.   IP OR SWING BED PT PLAN  Inpatient or Swing Bed: Inpatient  PT Plan  Treatment/Interventions: Bed mobility, Transfer training, Gait training, Stair training, Balance training, Strengthening, Endurance training, Range of motion, Therapeutic exercise, Therapeutic activity, Home exercise program  PT Plan: Ongoing PT  PT Frequency: BID  PT Discharge Recommendations: Low intensity level of continued care  Equipment Recommended upon Discharge: Wheeled walker  PT Recommended Transfer Status: Contact guard, Assistive device  PT - OK to Discharge: Yes      Subjective     PT Visit Info:  PT Received On: 07/24/25  General Visit Information:  General  Reason for Referral: Recent Surg  Referred By: Dr. Mercedes  Past Medical History Relevant to Rehab: [place]  Family/Caregiver Present: No  Prior to Session Communication: Bedside nurse  Patient Position Received:   (Sitting at EOB of SDS bed)  Preferred Learning Style: visual, verbal  General Comment: Agreeable to PT eval  Home Living:  Home Living  Type of Home: House  Lives With: Significant other  Home Adaptive Equipment: Cane, Walker rolling or standard  Home Layout: Multi-level, Stairs to alternate level with rails  Alternate Level Stairs-Rails:  (Half have B rails, half has rail on R)  Alternate Level Stairs-Number of Steps: 12  Home Access: Stairs to enter without rails  Entrance Stairs-Rails: None  Entrance Stairs-Number of Steps: 1+1+1  Bathroom Shower/Tub: Tub/shower unit  Bathroom Equipment: Shower chair with back  Prior Level of Function:  Prior Function Per Pt/Caregiver Report  Level of [place]: Independent with ADLs and functional transfers, Independent with homemaking with ambulation  Receives Help From: Family  ADL Assistance: Independent  Homemaking Assistance: Independent  Ambulatory Assistance: Independent (no AD)  Prior Function Comments: denies fall hx  Precautions:  Precautions  Hearing/Visual Limitations: Very [place]  LE Weight Bearing Status: Weight Bearing as Tolerated  Medical Precautions: Fall precautions  Post-Surgical Precautions: Right total knee precautions     Date/Time Vitals Session Patient Position Pulse Resp SpO2 BP MAP (mmHg)    07/24/25 1620 --  --  83  18  96 %  135/71  --           Objective   Pain:  Pain Assessment  Pain Assessment: 0-10  0-10 (Numeric) Pain Score: 7  Pain Type: Surgical pain (RN aware, I would FLACC pain at 10/10 as she verbally reports 7 but actively cries throughout session in pain. Pt repeatedly says that she is fine and wants to keep going)  Pain Location: Knee  Pain Orientation: Right  Pain Interventions: Ambulation/increased activity  Cognition:  Cognition  Overall Cognitive Status: Within Functional Limits  Orientation Level: Oriented X4    General Assessments:  Activity Tolerance  Endurance: Decreased tolerance for upright activites    Sensation  Light Touch:  No apparent deficits    Strength  Strength Comments: 2/5 RLE, 5/5 LLE  Functional Assessments:  Bed Mobility  Bed Mobility: No    Transfers  Transfer: Yes  Transfer 1  Transfer From 1: Bed to  Transfer to 1: Stand  Technique 1: Stand to sit, Sit to stand  Transfer Device 1: Walker  Transfer Level of Assistance 1: Contact guard  Trials/Comments 1: Cues on technique, WBAT, and precautions  Transfers 2  Transfer From 2: Wheelchair to  Transfer to 2: Stand  Technique 2: Sit to stand, Stand to sit  Transfer Device 2: Walker  Transfer Level of Assistance 2: Contact guard  Trials/Comments 2: x2 trials-cues on hand placement, AD use, and setup    Ambulation/Gait Training  Ambulation/Gait Training Performed: Yes  Ambulation/Gait Training 1  Surface 1: Level tile  Device 1: Rolling walker  Assistance 1: Contact guard  Comments/Distance (ft) 1: 60' slow, shuffled, and antalgic. Cues on gait sequencing for improved pain control during WB    Stairs  Stairs: Yes  Stairs  Rails 1: Bilateral  Device 1: No device  Assistance 1: Minimum assistance  Comment/Number of Steps 1: 1 step x2 B rails no AD: Min A to steady and assits with descent initially as Pt struggles to load the post op LE. Cues on safe technique  Stairs 2  Rails 2: Right  Device 2: Single point cane  Assistance 2: Contact guard  Comment/Number of Steps 2: 1 step x12 CGA rail on R with SPC in LUE: Cues on technique    Treatments:  Ambulation/Gait Training  Ambulation/Gait Training Performed: Yes  Ambulation/Gait Training 1  Surface 1: Level tile  Device 1: Rolling walker  Assistance 1: Contact guard  Comments/Distance (ft) 1: 60' slow, shuffled, and antalgic. Cues on gait sequencing for improved pain control during WB  Stairs  Stairs: Yes  Stairs  Rails 1: Bilateral  Device 1: No device  Assistance 1: Minimum assistance  Comment/Number of Steps 1: 1 step x2 B rails no AD: Min A to steady and assits with descent initially as Pt struggles to load the post op LE. Cues on  safe technique  Stairs 2  Rails 2: Right  Device 2: Single point cane  Assistance 2: Contact guard  Comment/Number of Steps 2: 1 step x12 CGA rail on R with SPC in LUE: Cues on technique  Outcome Measures:  [facility] Basic Mobility  Turning from your back to your side while in a flat bed without using bedrails: A little  Moving from lying on your back to sitting on the side of a flat bed without using bedrails: A little  Moving to and from bed to chair (including a wheelchair): A little  Standing up from a chair using your arms (e.g. wheelchair or bedside chair): A little  To walk in hospital room: A little  Climbing 3-5 steps with railing: A little  Basic Mobility - Total Score: 18    Encounter Problems       Encounter Problems (Active)       Mobility       Ambulation (Progressing)       Start:  07/24/25    Expected End:  08/07/25       Pt will ambulate 50' modified independent assist with walker to promote safe home mobility           Entry Stair Negotiation (Progressing)       Start:  07/24/25    Expected End:  08/07/25       Pt will ascend/descend 1+1+1 stairs, no rails, and walker with modified independent assist to safely enter/exit the home environment           Between Floor Stair Negotiation (Progressing)       Start:  07/24/25    Expected End:  08/07/25       Pt will ascend/descend 12 stairs with rail(s) on 6 steps and no rails on the other half  with modified independent assist and least restrictive device to promote safe navigation at home between floors              PT Transfers       Supine to sit (Progressing)       Start:  07/24/25    Expected End:  08/07/25       Pt will transfer supine to sitting at edge of bed with modified independent assist to promote acute care out of bed activity           Sit to stand (Progressing)       Start:  07/24/25    Expected End:  08/07/25       Pt will transfer sit to standing position with modified independent assist and walker to promote safe out of bed activity               Safety       Safe Mobility Techniques (Progressing)       Start:  07/24/25    Expected End:  08/07/25       Pt will correctly identify and demonstrate safe mobility techniques to reduce their risks for falls during their acute care stay            Joint Precautions (Progressing)       Start:  07/24/25    Expected End:  08/07/25       Pt will correctly verbalize 100% of their post op precautions and correctly demonstrate these during functional movement without cuing needs from therapist                  Education Documentation  Mobility Training, taught by Eyal Villanueva, PT at 7/24/2025  5:00 PM.  Learner: Patient  Readiness: Acceptance  Method: Explanation, Handout  Response: Verbalizes Understanding  Comment: safe mobility techniques, WBAT, post op precautions. Handout given on precautions+HEP    Education Comments  No comments found.

## 2025-07-24 NOTE — ANESTHESIA PREPROCEDURE EVALUATION
Patient: Izzy Cantor    Procedure Information       Date/Time: 07/24/25 1015    Procedure: Total Knee Replacement (Right: Knee) - depuy attune    [facility]: [facility] / [facility]    Surgeons: Chirag Mercedes MD            Relevant Problems   Cardiac   (+) Chest wall pain   (+) Hypertension   (+) Nonrheumatic aortic valve stenosis      Pulmonary   (+) Asthma with COPD (Multi)      Neuro   (+) Right carpal tunnel syndrome   (+) Sciatica, left side      Endocrine   (+) Hypothyroidism   (+) Postoperative hypothyroidism      Musculoskeletal   (+) DDD (degenerative disc disease), lumbar   (+) Osteoarthritis   (+) Right carpal tunnel syndrome     Echo 2022:  CONCLUSIONS:  1. Left ventricular systolic function is normal with a 55-60% estimated  ejection fraction.  2. Poorly visualized anatomical structures due to suboptimal image quality.  3. Spectral Doppler shows an impaired relaxation pattern of left ventricular  diastolic filling.  4. RV normal function and size.  5. No pericardial effusion.  6. Mitral valve structurally normal.  7. Trace tricuspid regurgitation.  8. Aortic valve appears abnormal.  9. Mild calcific aortic stenosis MIO 1.8 cm2, V max 1.79 meters/seconds, p/m  gradient 13/7 millimeters of mercury.    Clinical information reviewed:   Tobacco  Allergies  Meds  Problems  Med Hx  Surg Hx   Fam Hx          NPO Detail:  No data recorded     Physical Exam    Airway  Mallampati: II  TM distance: >3 FB  Neck ROM: full     Cardiovascular    Dental    Pulmonary    Abdominal            Anesthesia Plan    History of general anesthesia?: yes  History of complications of general anesthesia?: no    ASA 3     regional and general   (Discussed with patient, patient refuses spinal anesthesia and would prefer GA. Risks and benefits discussed, will plan for GA.)  intravenous induction   Anesthetic plan and risks discussed with patient.    Plan discussed with CRNA.

## 2025-07-24 NOTE — OP NOTE
Total Knee Replacement (R) Operative Note     Date: 2025  OR Location: [facility]    Name: Izzy Cantor, : 1965, Age: 60 y.o., MRN: [id number], Sex: female    Diagnosis  Pre-op Diagnosis      * Right knee pain, unspecified chronicity [M25.561] Post-op Diagnosis     * Right knee pain, unspecified chronicity [M25.561]     Procedures  Total Knee Replacement  71016 - OK ARTHRP KNE CONDYLE&PLATU MEDIAL&LAT COMPARTMENTS      Surgeons      * Chirag Mercedes - Primary    Resident/Fellow/Other Assistant:  Surgeons and Role:     * Osmin Mustafa PA-C - Assisting    Staff:   Herbertulator: Faina  Surgical Assistant: Rufino Singh Person: Mak    Anesthesia Staff: Anesthesiologist: Aaron Garcia MD  CRNA: KENRICK Guzman-CRNA    Procedure Summary  Anesthesia: Regional, Spinal  ASA: III  Estimated Blood Loss: 50mL  Intra-op Medications:   Administrations occurring from 1015 to 1240 on 25:   Medication Name Total Dose   ropivacaine-epinephrine-clonidine-ketorolac 2.46-0.005- 0.0008-0.3mg/mL periarticular syringe 50 mL 50 mL   dexAMETHasone (Decadron) 4 mg/mL IV Syringe 2 mL 4 mg   dexMEDETOMidine 4 mcg/mL in NS syringe 4 mcg   fentaNYL (Sublimaze) injection 50 mcg/mL 100 mcg   HYDROmorphone (Dilaudid) injection 1 mg/mL 1 mg   ipratropium-albuterol (Duo-Neb) nebulizer solution 0.5-2.5 mg/3 mL 2.5 mg   ketamine injection 50 mg/ 5 mL (10 mg/mL) 20 mg   LR infusion 245.83 mL   ondansetron (Zofran) 2 mg/mL injection 4 mg   propofol (Diprivan) injection 10 mg/mL 1,200.76 mg   tranexamic acid (Cyklokapron) injection 1,000 mg   ceFAZolin (Ancef) 2 g in dextrose (iso)  mL 2 g   ropivacaine (Naropin) injection 0.5 % 10 mL              Anesthesia Record               Intraprocedure I/O Totals          Intake    Tranexamic Acid 0.00 mL    The total shown is the total volume documented since Anesthesia Start was filed.    Total Intake 0 mL       Output    Est. Blood Loss 50 mL    Total Output 50 mL       Net     Net Volume -50 mL          Specimen:   ID Type Source Tests Collected by Time   1 : RIGHT BONE COMPONENTS Tissue KNEE CONTENTS REAMINGS RIGHT SURGICAL PATHOLOGY EXAM Chirag Mercedes MD 2025 1117                 Drains and/or Catheters: * None in log *    Tourniquet Times:   * Missing tourniquet times found for documented tourniquets in lo *     Implants:  Implants       Type Name Action Serial No.      Joint Knee INSERT, ATTUNE PS RP, SZ 5, 10MM - [id number] Implanted      Joint FEMORAL, ATTUNE PS, POR, SZ 5, RT - [id number] Implanted      Joint Knee TIBAL BASE, ATTUNE RP, POR, SZ 4 - [id number] Implanted       ATTUNE KNEE SYSTEM PATELLA MEDIALIZED 32MM Implanted               Findings: severe djd    Indications: Izzy Cantor is an 60 y.o. female who is having surgery for Right knee pain, unspecified chronicity [M25.561].     The patient was seen in the preoperative area. The risks, benefits, complications, treatment options, non-operative alternatives, expected recovery and outcomes were discussed with the patient. The possibilities of reaction to medication, pulmonary aspiration, injury to surrounding structures, bleeding, recurrent infection, the need for additional procedures, failure to diagnose a condition, and creating a complication requiring transfusion or operation were discussed with the patient. The patient concurred with the proposed plan, giving informed consent.  The site of surgery was properly noted/marked if necessary per policy. The patient has been actively warmed in preoperative area. Preoperative antibiotics have been ordered and given within 1 hours of incision. Venous thrombosis prophylaxis have been ordered including bilateral sequential compression devices and chemical prophylaxis    No qualified Orthopedic Resident was available to assist with this procedure.  Therefore, the assistance of PA named above, was required throughout this entire procedure.    This PA is  specifically trained and experienced in assisting with this procedure.  During the procedure, he actively assisted Dr. Mercedes in completing the operation safely and expeditiously by helping to provide exposure, maintain hemostasis, and served other technical functions, such as suturing, assisting in drilling, etc.  We will be billing for that PA, as a required First Assistant for this procedure.    Justification for 22 modifier:    The patient has an elevated BMI and morbid obesity.  Because of that the patient has a significantly increased soft tissue envelope.  Significant additional work and effort was required to complete the surgery.  This work was required and surgical exposure bony resections as well as component implantation.  This went significantly beyond the usual work time and effort required to complete the surgery therefore 22 modifier is justified      Procedure Details: Statement of medical necessity:  This is a 60 y.o.  female with severe degenerative disease of the knee that has failed non operative management. the risks, benefits and alternatives of total knee arthroplasty were discussed with the patient and they signed informed consent.     Surgical assistants, as listed above, including residents if listed, were involved in the procedure. They are trained and qualified to assist in the procedure. Prior to the procedure they assisted with patient positioning, setup, and surgical skin preparation. During the procedure, they actively assisted Dr. Mercedes in completing the operation safely and expeditiously by helping to provide exposure, retract tissues, maintain hemostasis, closure, and any other necessary technical tasks.     Description of procedure:   The patient was brought to the operating room and placed on the operating table in the supine position. All bony prominences were well padded. Appropriate preoperative antibiotics were administered. Anesthesia was induced by the anesthesia team and  a time out was performed. The patient was identified by name and medical record number and the laterality and the site of surgery were confirmed by all present.     Under pneumatic tourniquet control, a longitudinal anterior skin incision was made with medial parapatellar arthrotomy. Hemostasis was obtained with Bovie electrocautery. Comprehensive exposure the knee was carried out for total knee arthroplasty. The patella was subluxed laterally and the knee placed in a flexed position.      Using intramedullary alignment, the distal femur was cut in a 5° valgus position. The appropriate-sized femoral component was selected based upon intraoperative measurements of the sagittal dimension of the femur. The distal femoral cutting guide was placed perpendicular to Whitesides line and parallel with the transepicondylar axis, with  3° of external rotation based upon the posterior condylar axis. The distal femur was then finished to accept a posterior stabilized prosthesis.     Attention was then directed to the proximal tibia. Meniscal remnants were excised. Using extramedullary alignment, the proximal tibia was cut perpendicular to its longitudinal axis with a 3° posterior slope. Osteophytes were excised from the posterior femur, medial and lateral femur, and circumferentially about the tibia to avoid soft tissue impingement. The posterior capsule, medial lateral soft tissue structures were injected using combination of Duramorph, Ropivicaine, toradol and epinephrine.Flexion and extension gaps were assessed.     Flexion and extension gaps were equal and rectangular. No ligamentous release was necessary for appropriate balance.     Trial components were placed. Knee achieved full extension and flexion with excellent varus and valgus stability throughout range of motion. The patella tracked centrally. Tibial component rotation was marked, and the tibia finished in the usual fashion to accept an appropriately sized tibial  component.     bone surfaces prepared with pulsatile saline irrigation. Bone quality was assessed and deemed appropriate for press fit implants. Final implants were inserted and impacted into position with excellent stability noted. The final articular polyethyelne surface was then inserted.      The patella was assessed and its surfaces judged appropriate for prosthetic resurfacing patellar arthroplasty. Patellar was measured and cut using an oscillating saw.  It was prepared for prosthetic resurfacing in the standard fashion. Additionally,  excision of synovium from the periphery of the patella and circumferential electrocautery and excision of ostephytes was also performed. with the trial component in place there was excellent patellar tracking and mechanics.     With the final implants in placed the mechanics of the knee were once again confirmed and noted to be excellent.      Joint was irrigated with dilute betadine solution, followed by normal saline.   The arthrotomy was reapproximated using #1 poly and #2 quill sutures. Subcutaneous layer was closed with 2-0 poly, subcuticular layer with 3-0 v-lock, then perineo mesh and glue dressing was placed followed by mepilex, cast padding, and ace wrap.     Patient was awoken from anesthesia and brought to the pacu in stable position.     Statement of staff presence: I was present and scrubbed for all critical portions of the procedure and was immediately available during closing      Complications:  None; patient tolerated the procedure well.    Disposition: PACU - hemodynamically stable.  Condition: stable         Additional Details: none    Attending Attestation: I was present and scrubbed for the key portions of the procedure.    Chirag Mercedes  Phone Number: [phone]

## 2025-07-24 NOTE — POST-PROCEDURE NOTE
1428- pt brought back from pacu,verbal report received. Pt is alert and awake. Snack and drink given. Rx to go called. SO being brought back to bedside.    1550- PT at bedside.     1620- vss. Pt tolerating snack and drink. Pt getting dressed at this time with assistance of this nurse.    1632- volunteer at bedside with wheelchair for dc.

## 2025-07-24 NOTE — DISCHARGE INSTRUCTIONS
TOTAL HIP AND KNEE REPLACEMENT DISCHARGE INSTRUCTIONS  Chirag Mercedes MD      DRIVING & TRAVEL AFTER SURGERY   Patients should anticipate waiting at least 4-6 weeks before traveling long distances after surgery.  You will need to stop to walk around ever 1 hour during your travel to help with blood clot prevention.  Please call the office or your joint nurse to discuss prior to post-surgical travel.  Patients may not drive until cleared by the joint nurse or the office.    DENTAL PROCEDURES & CLEANINGS  You must wait a minimum of 3 months for elective dental appointments, including routine cleanings or dental work including bridges, crowns, extractions, etc..  For any dental visit - cleaning or dental procedures - patients must take an antibiotic 1 hour before the appointment.  Antibiotics are a lifelong need before dental appointments.  The antibiotic prescribed will be based on each patient's allergies.    WOUND CARE  If you experience continued drainage or bleeding, you may cover with abdominal pads (purchase at local drug store).  Knee replacements should wrap with an ace wrap.  Do not remove surgical dressing, it will be removed when you arrive in clinic for follow up visit.   Mesh dressing under the bandage will remain in place until it peels off on its own.  If it is loose, you may gently remove.  Do not remove if pulling causes resistance against the incision.      PAIN, SWELLING, BRUISING & CLICKING  Pain and swelling are a natural part of your recovery which is considered normal fur up to a year after surgery.  Symptoms may be treated with movement, ice, compression stockings, elevating your leg, and by following the pain medication regimen as prescribed.  Bruising is normal for several weeks after surgery and will run down the leg over time.  You may ice areas that are tender to help with discomfort.  You are required to wear the provided compression stockings, every day, for 4 weeks following surgery.   Remove the stockings at night and place them back on in the morning.  Pain and swelling may temporarily increase with an increase in activity or exercise.  Use ice after activity.  Audible clicking with movement or exercises is considered normal following joint replacement.  You may also feel decreased sensation or numbness near the incision site.  These are usually normal and may or may not fade over time.     PERSONAL HYGIENE  You may shower upon discharging from the hospital.  Soap and water is permitted to run over the surgical dressing, steri-strips and incision.  Do not scrub directly over these items.  DO NOT soak your incision in a bath, hot tub, pool or pond/lake for a minimum of 8 weeks following your surgery.  DO NOT use lotions, creams, ointments on your wound for a minimum of 6 weeks following your surgery. At that time you may use vitamin E to assist with softening of your incision.      RESTARTING HOME ROUTINE - DIET & MEDICATIONS  Post-operative constipation can result due to a combination of inactivity, anesthesia and pain medication. To help prevent this, you should increase your water and fiber intake. Physical activity such as walking will also help stimulate the bowels.   You may resume your normal diet when you discharge home.  Choose foods that help promote good bowel habits and prevent constipation, such as foods high in fiber.  You may restart your home medications the following day after your surgery UNLESS you have been given alternate instructions.  Follow the instructions given to you on your hospital discharge instructions for more information regarding your home medications.      IN-HOME PHYSICAL THERAPY & OUTPATIENT PHYSICAL THERAPY  In-home physical therapy will start 1-2 days after you get home from the hospital.    The home care agency will call within the first 24-48 hours to set up their first visit.  Please do not call your care team to inquire during this timeframe.  Continue  the exercises you were given in the hospital until you have been seen by in-home therapy.  Make sure to provide a phone number with the ability for the home care staff to leave a message if you do not answer your phone.    Outpatient physical therapy following knee replacement surgery should begin 2-3 weeks after surgery.  You should call to schedule this appointment ASAP if not already scheduled before surgery.  Waiting until you are ready for outpatient physical therapy will cause a delay in your care.  You may choose any outpatient physical therapy location.  Call the office for an order if needed.    EMERGENCIES - WHEN TO CONTACT THE SURGEON'S OFFICE IMMEDIATELY  Fever >101 with chills that has been present for at least 48 hours.   Excessive bleeding from incision that will not slow down. A small amount of drainage is normal and expected.  Once pressure is applied and the area is covered, do not continue to check the area regularly.  This will remove pressure and bleeding will continue.  Leave in place for 4-6 hours.  Signs of infection of incision-excessive drainage that is soaking through your dressing (especially if it is pus-like), redness that is spreading out from the edges of your incision, or increased warmth around the area.  Excruciating pain for which the pain medication, taken as instructed, is not helping.  Severe calf pain.  Go directly to the emergency room or call 911, if you are experiencing chest pain or difficulty breathing.    ICE & COLD THERAPY INSTRUCTIONS    To assist with pain control and post-op swelling, you should be using ice regularly throughout recovery, especially for the first 6 weeks, regardless of the cold therapy method you use.      Always make sure there is a layer of protection between the cold pad and your skin.    If you are using ICE PACKS or GEL PACKS, you will need to alternate 20 minutes on, 20 minutes off twice per hour.    If you are using an ICE MACHINE, please  follow the provided ice machine instructions.  These devices differ from ice or ice packs whereas the mechanism circulates water through tubing and a pad to provide longer periods of cold therapy to the desired site.  You can use your cold devices around the clock for optimal comfort.  We recommend using cold therapy after working with therapy or completing exercises on your own.  There is no set schedule in which you must follow while using cold therapy.  Below are a few points to remember when using a cold therapy device:    You do not need to need to use the 20 on, 20 off method.  Detach the pad from the cooler and ambulate at least once every hour.  You can check your skin under the pad at this time.  You may wear the cold therapy device during periods of sleep including overnight.  If you wake up during the night, you can check the skin at this time.  You do not need to wake up specifically to perform skin checks.  Empty the cooler and pad when device is not in use.  Follow 's instructions for cleaning your cold therapy device.      DISCHARGE MEDICATIONS    PAIN MEDICATION    ____ Oxycodone-acetaminophen  Oxycodone-acetaminophen has been prescribed for post-operative pain control.    These medications may only be refilled if neededONCE every 7 days for a period of up to 6 weeks following surgery.  After 6 weeks, you will transition to acetaminophen and over -the- counter anti-inflammatories such as Ibuprofen, Advil or Aleve in conjunction with ICE/COLD THERAPY.   Side effects may be constipation and nausea, vomiting, sleepiness, dizziness, lightheadedness, headache, blurred vision, dry mouth sweating, itching (if you have itching, over-the -counter Benadryl can be used as needed).  You may NOT operate a motor vehicle while taking these medications or have been cleared by your care team.    Please call the office for a refill request.  The office staff and orthopedic nurses cannot refill medications;  messages should be left directly through the office.  Please do not call multiple times or call other members of the care team for medication needs, this will cause the refill to take longer.    Per State and Institutional policy, pain medications can only be refilled every 7 days for up to six weeks following surgery.   .    _______ Naproxen has been prescribed as an adjunct anti-inflammatory to assist in pain control.    Take one 500 mg tablet twice a day for 4 weeks.  You will not receive refills on this medication.   Side effects may include nausea.  May not be prescribed if you are on a more potent blood thinner than aspirin or have chronic kidney disease.    BLOOD THINNER    ____ Blood Thinner   A blood thinner has been prescribed to prevent blood clots in your leg or lungs. Take as prescribed on the bottle for 4 weeks. You will not receive a refill on this medication.      STOOL SOFTENERS    ____ Colace (Docusate Sodium)   Take this medication to help with constipation while using the Oxycodone for pain control.  You will not receive a refill on this medication.  If you have not had a bowel movement for 2 days after surgery if you feel painful constipation, you may try over-the-counter Miralax or a suppository. If you still are not able to have a bowel movement, call our office or discuss with your PCP. If you have preexisting constipation or other GI issues that may predispose you to constipation, please consult with your GI provider or PCP if you have not had a bowel movement for 2 days after surgery.     Antibiotics    ____ Cefadroxil or doxycycline  May be prescribed if needed for prophylaxis against infection   Take 7 day course of antibiotics until they are all gone  May not be prescribed if you do not meet criteria for elevated infection risk after surgery          You will not receive refills on the following medications:    Naproxen  Colace  Blood Thinner

## 2025-07-24 NOTE — ANESTHESIA PROCEDURE NOTES
Peripheral Block    Patient location during procedure: pre-op  Medication administered at: 7/24/2025 10:15 AM  End time: 7/24/2025 10:16 AM  Reason for block: at surgeon's request and post-op pain management  Staffing  Performed: attending   Authorized by: Aaron Garcia MD    Performed by: Aaron Garcia MD  Preanesthetic Checklist  Completed: patient identified, IV checked, site marked, risks and benefits discussed, surgical consent, monitors and equipment checked, pre-op evaluation and timeout performed   Timeout performed at: 7/24/2025 10:11 AM  Peripheral Block  Patient position: laying flat  Prep: ChloraPrep  Patient monitoring: heart rate, cardiac monitor and continuous pulse ox  Block type: adductor canal  Laterality: right  Injection technique: single-shot  Guidance: ultrasound guided  Local infiltration: ropivacaine  Infiltration strength: 0.5 %  Dose: 20 mL  Needle  Needle type: short-bevel   Needle gauge: 21 G  Needle length: 10 cm  Needle localization: ultrasound guidance  Assessment  Injection assessment: negative aspiration for heme, no paresthesia on injection, incremental injection and local visualized surrounding nerve on ultrasound  Paresthesia pain: none  Heart rate change: no  Slow fractionated injection: yes  Medications Administered  ropivacaine (NAROPIN) 5 MG/ML Perineural - perineural injection   20 mL - 7/24/2025 10:15:00 AM

## 2025-07-24 NOTE — ANESTHESIA PROCEDURE NOTES
Peripheral Block    Patient location during procedure: pre-op  Medication administered at: 7/24/2025 10:17 AM  End time: 7/24/2025 10:18 AM  Reason for block: at surgeon's request and post-op pain management  Staffing  Performed: attending   Authorized by: Aaron Garcia MD    Performed by: Aaron Garcia MD  Preanesthetic Checklist  Completed: patient identified, IV checked, site marked, risks and benefits discussed, surgical consent, monitors and equipment checked, pre-op evaluation and timeout performed   Timeout performed at: 7/24/2025 10:11 AM  Peripheral Block  Patient position: laying flat  Prep: ChloraPrep  Patient monitoring: heart rate, cardiac monitor and continuous pulse ox  Block type: IPACK  Laterality: right  Injection technique: single-shot  Guidance: ultrasound guided  Local infiltration: ropivacaine  Infiltration strength: 0.5 %  Dose: 10 mL  Needle  Needle type: short-bevel   Needle gauge: 21 G  Needle length: 10 cm  Needle localization: ultrasound guidance  Assessment  Injection assessment: negative aspiration for heme, no paresthesia on injection, incremental injection and local visualized surrounding nerve on ultrasound  Paresthesia pain: none  Heart rate change: no  Slow fractionated injection: yes  Medications Administered  ropivacaine (NAROPIN) 5 MG/ML Perineural - perineural injection   10 mL - 7/24/2025 10:17:00 AM

## 2025-07-24 NOTE — ANESTHESIA POSTPROCEDURE EVALUATION
Patient: Izzy Cantor    Procedure Summary       Date: 07/24/25 Room / Location: [facility] / [facility]    Anesthesia Start: 1042 Anesthesia Stop: 1250    Procedure: Total Knee Replacement (Right: Knee) Diagnosis:       Right knee pain, unspecified chronicity      (Right knee pain, unspecified chronicity [M25.561])    Surgeons: Chirag Mercedes MD Responsible Provider: Aaron Garcia MD    Anesthesia Type: regional, spinal ASA Status: 3            Anesthesia Type: regional, spinal    Vitals Value Taken Time   /78 07/24/25 13:06   Temp 36.5 °C (97.7 °F) 07/24/25 12:50   Pulse 80 07/24/25 13:07   Resp 11 07/24/25 13:07   SpO2 99 % 07/24/25 13:07   Vitals shown include unfiled device data.    Anesthesia Post Evaluation    Patient location during evaluation: PACU  Patient participation: complete - patient participated  Level of consciousness: awake  Pain management: adequate  Airway patency: patent  Cardiovascular status: acceptable  Respiratory status: acceptable  Hydration status: acceptable  Postoperative Nausea and Vomiting: none        There were no known notable events for this encounter.

## 2025-07-25 ENCOUNTER — HOME CARE VISIT (OUTPATIENT)
Dept: HOME HEALTH SERVICES | Facility: HOME HEALTH | Age: 60
End: 2025-07-25
Payer: COMMERCIAL

## 2025-07-25 VITALS
SYSTOLIC BLOOD PRESSURE: 111 MMHG | OXYGEN SATURATION: 95 % | HEART RATE: 78 BPM | DIASTOLIC BLOOD PRESSURE: 68 MMHG | TEMPERATURE: 98.7 F

## 2025-07-25 PROCEDURE — G0151 HHCP-SERV OF PT,EA 15 MIN: HCPCS

## 2025-07-25 ASSESSMENT — ENCOUNTER SYMPTOMS
PAIN: 1
LOWEST PAIN SEVERITY IN PAST 24 HOURS: 7/10
PAIN LOCATION - PAIN SEVERITY: 8/10
PAIN LOCATION: RIGHT KNEE
AGGRESSION WITHIN DEFINED LIMITS: 1
PERSON REPORTING PAIN: PATIENT
ANGER WITHIN DEFINED LIMITS: 1
SLEEP QUALITY: FAIR

## 2025-07-25 ASSESSMENT — ACTIVITIES OF DAILY LIVING (ADL)
ENTERING_EXITING_HOME: NEEDS ASSISTANCE
OASIS_M1830: 03

## 2025-07-28 ENCOUNTER — TELEPHONE (OUTPATIENT)
Dept: ORTHOPEDIC SURGERY | Facility: CLINIC | Age: 60
End: 2025-07-28
Payer: COMMERCIAL

## 2025-07-28 ENCOUNTER — TELEPHONE (OUTPATIENT)
Dept: INPATIENT UNIT | Facility: HOSPITAL | Age: 60
End: 2025-07-28
Payer: COMMERCIAL

## 2025-07-28 DIAGNOSIS — M17.11 ARTHRITIS OF KNEE, RIGHT: ICD-10-CM

## 2025-07-28 RX ORDER — OXYCODONE AND ACETAMINOPHEN 5; 325 MG/1; MG/1
1 TABLET ORAL EVERY 4 HOURS PRN
Qty: 36 TABLET | Refills: 0 | Status: SHIPPED | OUTPATIENT
Start: 2025-07-28 | End: 2025-08-04 | Stop reason: SDUPTHER

## 2025-07-28 NOTE — TELEPHONE ENCOUNTER
7/24/25 rt tka  Refill on Oxycodone-Acetaminophen 5-325mg oral tablets. Patient still has some left but wanted to put the refill in now. Let her know pharmacy may not fill until its time.    [facility], [place].  Pain level: 7

## 2025-07-29 ENCOUNTER — HOME CARE VISIT (OUTPATIENT)
Dept: HOME HEALTH SERVICES | Facility: HOME HEALTH | Age: 60
End: 2025-07-29
Payer: COMMERCIAL

## 2025-07-29 VITALS
RESPIRATION RATE: 18 BRPM | OXYGEN SATURATION: 97 % | HEART RATE: 84 BPM | SYSTOLIC BLOOD PRESSURE: 132 MMHG | DIASTOLIC BLOOD PRESSURE: 80 MMHG | TEMPERATURE: 97.2 F

## 2025-07-29 PROCEDURE — G0157 HHC PT ASSISTANT EA 15: HCPCS | Mod: CQ

## 2025-07-29 ASSESSMENT — ENCOUNTER SYMPTOMS
PAIN LOCATION: RIGHT KNEE
PAIN LOCATION - PAIN FREQUENCY: INTERMITTENT
PAIN SEVERITY GOAL: 0/10
PERSON REPORTING PAIN: PATIENT
SUBJECTIVE PAIN PROGRESSION: UNCHANGED
PAIN LOCATION - PAIN DURATION: INTERMITTENT
PAIN LOCATION - PAIN QUALITY: SHARP
PAIN: 1
PAIN LOCATION - EXACERBATING FACTORS: ACTIVITY
PAIN LOCATION - RELIEVING FACTORS: REST, ICE, MEDICATION
PAIN LOCATION - PAIN SEVERITY: 7/10
HIGHEST PAIN SEVERITY IN PAST 24 HOURS: 8/10
LOWEST PAIN SEVERITY IN PAST 24 HOURS: 5/10

## 2025-07-31 ENCOUNTER — HOME CARE VISIT (OUTPATIENT)
Dept: HOME HEALTH SERVICES | Facility: HOME HEALTH | Age: 60
End: 2025-07-31
Payer: COMMERCIAL

## 2025-07-31 VITALS
TEMPERATURE: 97.2 F | DIASTOLIC BLOOD PRESSURE: 78 MMHG | RESPIRATION RATE: 18 BRPM | HEART RATE: 84 BPM | OXYGEN SATURATION: 96 % | SYSTOLIC BLOOD PRESSURE: 132 MMHG

## 2025-07-31 PROCEDURE — G0157 HHC PT ASSISTANT EA 15: HCPCS | Mod: CQ

## 2025-07-31 ASSESSMENT — ENCOUNTER SYMPTOMS
PAIN LOCATION - PAIN DURATION: INTERMITTENT
PAIN: 1
PAIN LOCATION: RIGHT KNEE
PERSON REPORTING PAIN: PATIENT
HIGHEST PAIN SEVERITY IN PAST 24 HOURS: 8/10
PAIN LOCATION - PAIN FREQUENCY: INTERMITTENT
LOWEST PAIN SEVERITY IN PAST 24 HOURS: 5/10
PAIN LOCATION - PAIN QUALITY: SHARP
SUBJECTIVE PAIN PROGRESSION: UNCHANGED
PAIN LOCATION - PAIN SEVERITY: 7/10
PAIN LOCATION - EXACERBATING FACTORS: ACTIVITY
PAIN LOCATION - RELIEVING FACTORS: REST, ICE, MEDICATION
PAIN SEVERITY GOAL: 0/10

## 2025-08-01 ENCOUNTER — HOME CARE VISIT (OUTPATIENT)
Dept: HOME HEALTH SERVICES | Facility: HOME HEALTH | Age: 60
End: 2025-08-01
Payer: COMMERCIAL

## 2025-08-01 VITALS
HEART RATE: 82 BPM | DIASTOLIC BLOOD PRESSURE: 80 MMHG | OXYGEN SATURATION: 98 % | RESPIRATION RATE: 18 BRPM | SYSTOLIC BLOOD PRESSURE: 132 MMHG | TEMPERATURE: 97.2 F

## 2025-08-01 PROCEDURE — G0157 HHC PT ASSISTANT EA 15: HCPCS | Mod: CQ

## 2025-08-01 ASSESSMENT — ENCOUNTER SYMPTOMS
PERSON REPORTING PAIN: PATIENT
PAIN LOCATION - EXACERBATING FACTORS: ACTIVITY
PAIN LOCATION - RELIEVING FACTORS: REST, ICE, MEDICATION
PAIN SEVERITY GOAL: 0/10
LOWEST PAIN SEVERITY IN PAST 24 HOURS: 5/10
PAIN LOCATION - PAIN SEVERITY: 6/10
PAIN LOCATION - PAIN DURATION: INTERMITTENT
SUBJECTIVE PAIN PROGRESSION: UNCHANGED
PAIN: 1
PAIN LOCATION - PAIN FREQUENCY: INTERMITTENT
PAIN LOCATION: RIGHT KNEE
HIGHEST PAIN SEVERITY IN PAST 24 HOURS: 8/10
PAIN LOCATION - PAIN QUALITY: ACHE

## 2025-08-04 ENCOUNTER — TELEPHONE (OUTPATIENT)
Dept: ORTHOPEDIC SURGERY | Facility: CLINIC | Age: 60
End: 2025-08-04
Payer: COMMERCIAL

## 2025-08-04 ENCOUNTER — HOME CARE VISIT (OUTPATIENT)
Dept: HOME HEALTH SERVICES | Facility: HOME HEALTH | Age: 60
End: 2025-08-04
Payer: COMMERCIAL

## 2025-08-04 VITALS
OXYGEN SATURATION: 98 % | TEMPERATURE: 97.8 F | RESPIRATION RATE: 18 BRPM | DIASTOLIC BLOOD PRESSURE: 72 MMHG | SYSTOLIC BLOOD PRESSURE: 122 MMHG | HEART RATE: 78 BPM

## 2025-08-04 LAB
LABORATORY COMMENT REPORT: NORMAL
PATH REPORT.FINAL DX SPEC: NORMAL
PATH REPORT.GROSS SPEC: NORMAL
PATH REPORT.RELEVANT HX SPEC: NORMAL
PATH REPORT.TOTAL CANCER: NORMAL

## 2025-08-04 PROCEDURE — G0157 HHC PT ASSISTANT EA 15: HCPCS | Mod: CQ

## 2025-08-04 RX ORDER — OXYCODONE AND ACETAMINOPHEN 5; 325 MG/1; MG/1
1 TABLET ORAL EVERY 4 HOURS PRN
Qty: 36 TABLET | Refills: 0 | Status: SHIPPED | OUTPATIENT
Start: 2025-08-04 | End: 2025-08-10

## 2025-08-04 ASSESSMENT — ENCOUNTER SYMPTOMS
PAIN LOCATION: RIGHT KNEE
PAIN LOCATION - PAIN DURATION: INTERMITTENT
PAIN: 1
PAIN LOCATION - EXACERBATING FACTORS: ACTIVITY
LOWEST PAIN SEVERITY IN PAST 24 HOURS: 2/10
HIGHEST PAIN SEVERITY IN PAST 24 HOURS: 6/10
PAIN LOCATION - PAIN SEVERITY: 5/10
SUBJECTIVE PAIN PROGRESSION: GRADUALLY IMPROVING
PAIN LOCATION - PAIN QUALITY: SHARP
PAIN SEVERITY GOAL: 0/10
PERSON REPORTING PAIN: PATIENT
PAIN LOCATION - RELIEVING FACTORS: REST, ICE, MEDICATION
PAIN LOCATION - PAIN FREQUENCY: INTERMITTENT

## 2025-08-04 NOTE — TELEPHONE ENCOUNTER
7/24/25 rt tka  Requesting a refill on Oxycodone-Acetaminophen 5-325mg oral tablet.    [facility], [address]/[place].  Pain level: 7

## 2025-08-07 ENCOUNTER — APPOINTMENT (OUTPATIENT)
Dept: ORTHOPEDIC SURGERY | Facility: CLINIC | Age: 60
End: 2025-08-07
Payer: COMMERCIAL

## 2025-08-07 ENCOUNTER — OFFICE VISIT (OUTPATIENT)
Dept: ORTHOPEDIC SURGERY | Facility: CLINIC | Age: 60
End: 2025-08-07
Payer: COMMERCIAL

## 2025-08-07 ENCOUNTER — HOSPITAL ENCOUNTER (OUTPATIENT)
Dept: RADIOLOGY | Facility: CLINIC | Age: 60
Discharge: HOME | End: 2025-08-07
Payer: COMMERCIAL

## 2025-08-07 DIAGNOSIS — Z96.651 S/P TOTAL KNEE ARTHROPLASTY, RIGHT: ICD-10-CM

## 2025-08-07 PROCEDURE — 73562 X-RAY EXAM OF KNEE 3: CPT | Mod: LT

## 2025-08-07 PROCEDURE — 73564 X-RAY EXAM KNEE 4 OR MORE: CPT | Mod: RIGHT SIDE | Performed by: RADIOLOGY

## 2025-08-07 PROCEDURE — 73562 X-RAY EXAM OF KNEE 3: CPT | Mod: RIGHT SIDE | Performed by: RADIOLOGY

## 2025-08-07 PROCEDURE — 73564 X-RAY EXAM KNEE 4 OR MORE: CPT | Mod: RT

## 2025-08-07 PROCEDURE — 99024 POSTOP FOLLOW-UP VISIT: CPT

## 2025-08-07 ASSESSMENT — PAIN SCALES - GENERAL: PAINLEVEL_OUTOF10: 7

## 2025-08-07 ASSESSMENT — PAIN - FUNCTIONAL ASSESSMENT: PAIN_FUNCTIONAL_ASSESSMENT: 0-10

## 2025-08-07 NOTE — PROGRESS NOTES
Doing great with recovery so far, right leg very swollen, she's still smoking, incision looking good, starting outpatient pt soon

## 2025-08-08 ENCOUNTER — HOME CARE VISIT (OUTPATIENT)
Dept: HOME HEALTH SERVICES | Facility: HOME HEALTH | Age: 60
End: 2025-08-08
Payer: COMMERCIAL

## 2025-08-08 VITALS
HEART RATE: 78 BPM | SYSTOLIC BLOOD PRESSURE: 132 MMHG | RESPIRATION RATE: 16 BRPM | OXYGEN SATURATION: 98 % | TEMPERATURE: 97.2 F | DIASTOLIC BLOOD PRESSURE: 80 MMHG

## 2025-08-08 PROCEDURE — G0157 HHC PT ASSISTANT EA 15: HCPCS | Mod: CQ

## 2025-08-08 ASSESSMENT — ENCOUNTER SYMPTOMS
PAIN LOCATION - PAIN FREQUENCY: INTERMITTENT
PAIN SEVERITY GOAL: 0/10
PAIN LOCATION: RIGHT KNEE
HIGHEST PAIN SEVERITY IN PAST 24 HOURS: 7/10
PAIN LOCATION - EXACERBATING FACTORS: ACTIVITY
PAIN LOCATION - PAIN QUALITY: SHARP
PAIN LOCATION - PAIN SEVERITY: 6/10
SUBJECTIVE PAIN PROGRESSION: UNCHANGED
PAIN LOCATION - RELIEVING FACTORS: REST, ICE, MEDICATION
PAIN LOCATION - PAIN DURATION: INTERMITTENT
LOWEST PAIN SEVERITY IN PAST 24 HOURS: 3/10
PERSON REPORTING PAIN: PATIENT
PAIN: 1

## 2025-08-12 ENCOUNTER — HOME CARE VISIT (OUTPATIENT)
Dept: HOME HEALTH SERVICES | Facility: HOME HEALTH | Age: 60
End: 2025-08-12
Payer: COMMERCIAL

## 2025-08-12 VITALS
RESPIRATION RATE: 17 BRPM | DIASTOLIC BLOOD PRESSURE: 64 MMHG | OXYGEN SATURATION: 95 % | SYSTOLIC BLOOD PRESSURE: 118 MMHG | TEMPERATURE: 98.4 F | HEART RATE: 78 BPM

## 2025-08-12 PROCEDURE — G0151 HHCP-SERV OF PT,EA 15 MIN: HCPCS

## 2025-08-12 ASSESSMENT — ENCOUNTER SYMPTOMS
PAIN LOCATION: RIGHT KNEE
PAIN LOCATION - PAIN SEVERITY: 2/10
PAIN: 1
LOWEST PAIN SEVERITY IN PAST 24 HOURS: 2/10
HIGHEST PAIN SEVERITY IN PAST 24 HOURS: 6/10
PAIN LOCATION - RELIEVING FACTORS: ICE
PERSON REPORTING PAIN: PATIENT
SUBJECTIVE PAIN PROGRESSION: GRADUALLY IMPROVING

## 2025-08-12 ASSESSMENT — ACTIVITIES OF DAILY LIVING (ADL)
HOME_HEALTH_OASIS: 03
OASIS_M1830: 01

## 2025-08-18 RX ORDER — OXYCODONE AND ACETAMINOPHEN 5; 325 MG/1; MG/1
1 TABLET ORAL EVERY 6 HOURS PRN
Qty: 28 TABLET | Refills: 0 | Status: SHIPPED | OUTPATIENT
Start: 2025-08-18 | End: 2025-08-25

## 2025-08-21 ENCOUNTER — EVALUATION (OUTPATIENT)
Dept: PHYSICAL THERAPY | Facility: CLINIC | Age: 60
End: 2025-08-21
Payer: COMMERCIAL

## 2025-08-21 DIAGNOSIS — M25.561 RIGHT KNEE PAIN: ICD-10-CM

## 2025-08-21 DIAGNOSIS — Z96.651 STATUS POST RIGHT KNEE REPLACEMENT: Primary | ICD-10-CM

## 2025-08-21 PROCEDURE — 97110 THERAPEUTIC EXERCISES: CPT | Mod: GP

## 2025-08-21 PROCEDURE — 97161 PT EVAL LOW COMPLEX 20 MIN: CPT | Mod: GP

## 2025-08-25 ENCOUNTER — TELEPHONE (OUTPATIENT)
Dept: ORTHOPEDIC SURGERY | Facility: CLINIC | Age: 60
End: 2025-08-25
Payer: COMMERCIAL

## 2025-08-25 DIAGNOSIS — Z96.652 STATUS POST TOTAL LEFT KNEE REPLACEMENT: ICD-10-CM

## 2025-08-25 RX ORDER — OXYCODONE AND ACETAMINOPHEN 5; 325 MG/1; MG/1
1 TABLET ORAL EVERY 6 HOURS PRN
Qty: 28 TABLET | Refills: 0 | Status: SHIPPED | OUTPATIENT
Start: 2025-08-25 | End: 2025-09-01

## 2025-08-26 ENCOUNTER — APPOINTMENT (OUTPATIENT)
Dept: RADIOLOGY | Facility: CLINIC | Age: 60
End: 2025-08-26
Payer: COMMERCIAL

## 2025-08-26 ENCOUNTER — TREATMENT (OUTPATIENT)
Dept: PHYSICAL THERAPY | Facility: CLINIC | Age: 60
End: 2025-08-26
Payer: COMMERCIAL

## 2025-08-26 DIAGNOSIS — M25.561 RIGHT KNEE PAIN: ICD-10-CM

## 2025-08-26 DIAGNOSIS — M25.561 ACUTE PAIN OF RIGHT KNEE: ICD-10-CM

## 2025-08-26 DIAGNOSIS — Z96.651 STATUS POST RIGHT KNEE REPLACEMENT: Primary | ICD-10-CM

## 2025-08-26 PROCEDURE — 97140 MANUAL THERAPY 1/> REGIONS: CPT | Mod: GP

## 2025-08-26 PROCEDURE — 97110 THERAPEUTIC EXERCISES: CPT | Mod: GP

## 2025-08-28 ENCOUNTER — TREATMENT (OUTPATIENT)
Dept: PHYSICAL THERAPY | Facility: CLINIC | Age: 60
End: 2025-08-28
Payer: COMMERCIAL

## 2025-08-28 DIAGNOSIS — M25.561 CHRONIC PAIN OF RIGHT KNEE: Primary | ICD-10-CM

## 2025-08-28 DIAGNOSIS — Z96.651 STATUS POST RIGHT KNEE REPLACEMENT: ICD-10-CM

## 2025-08-28 DIAGNOSIS — G89.29 CHRONIC PAIN OF RIGHT KNEE: Primary | ICD-10-CM

## 2025-08-28 DIAGNOSIS — M25.561 RIGHT KNEE PAIN: ICD-10-CM

## 2025-08-28 PROCEDURE — 97140 MANUAL THERAPY 1/> REGIONS: CPT | Mod: GP,CQ

## 2025-08-28 PROCEDURE — 97110 THERAPEUTIC EXERCISES: CPT | Mod: GP,CQ

## 2025-09-02 ENCOUNTER — TELEPHONE (OUTPATIENT)
Dept: ORTHOPEDIC SURGERY | Facility: CLINIC | Age: 60
End: 2025-09-02
Payer: COMMERCIAL

## 2025-09-02 RX ORDER — OXYCODONE AND ACETAMINOPHEN 5; 325 MG/1; MG/1
1 TABLET ORAL EVERY 6 HOURS PRN
Qty: 28 TABLET | Refills: 0 | Status: SHIPPED | OUTPATIENT
Start: 2025-09-02 | End: 2025-09-04 | Stop reason: SDUPTHER

## 2025-09-04 ENCOUNTER — APPOINTMENT (OUTPATIENT)
Dept: ORTHOPEDIC SURGERY | Facility: CLINIC | Age: 60
End: 2025-09-04
Payer: COMMERCIAL

## 2025-09-04 ENCOUNTER — HOSPITAL ENCOUNTER (OUTPATIENT)
Dept: RADIOLOGY | Facility: CLINIC | Age: 60
Discharge: HOME | End: 2025-09-04
Payer: COMMERCIAL

## 2025-09-04 DIAGNOSIS — Z96.651 S/P TOTAL KNEE ARTHROPLASTY, RIGHT: ICD-10-CM

## 2025-09-04 PROCEDURE — 73562 X-RAY EXAM OF KNEE 3: CPT | Mod: RIGHT SIDE | Performed by: RADIOLOGY

## 2025-09-04 PROCEDURE — 73564 X-RAY EXAM KNEE 4 OR MORE: CPT | Mod: RT

## 2025-09-04 PROCEDURE — 73564 X-RAY EXAM KNEE 4 OR MORE: CPT | Mod: RIGHT SIDE | Performed by: RADIOLOGY

## 2025-09-04 PROCEDURE — 73562 X-RAY EXAM OF KNEE 3: CPT | Mod: LT

## 2025-09-04 ASSESSMENT — PAIN SCALES - GENERAL: PAINLEVEL_OUTOF10: 5 - MODERATE PAIN

## 2025-09-04 ASSESSMENT — PAIN - FUNCTIONAL ASSESSMENT: PAIN_FUNCTIONAL_ASSESSMENT: 0-10

## 2025-09-05 ENCOUNTER — TREATMENT (OUTPATIENT)
Dept: PHYSICAL THERAPY | Facility: CLINIC | Age: 60
End: 2025-09-05
Payer: COMMERCIAL

## 2025-09-05 DIAGNOSIS — G89.29 CHRONIC PAIN OF RIGHT KNEE: Primary | ICD-10-CM

## 2025-09-05 DIAGNOSIS — M25.561 CHRONIC PAIN OF RIGHT KNEE: Primary | ICD-10-CM

## 2025-09-05 DIAGNOSIS — Z96.651 STATUS POST RIGHT KNEE REPLACEMENT: ICD-10-CM

## 2025-09-05 DIAGNOSIS — M25.561 RIGHT KNEE PAIN: ICD-10-CM

## 2025-09-05 PROCEDURE — 97140 MANUAL THERAPY 1/> REGIONS: CPT | Mod: GP,CQ

## 2025-09-05 PROCEDURE — 97110 THERAPEUTIC EXERCISES: CPT | Mod: GP,CQ

## 2025-10-16 ENCOUNTER — APPOINTMENT (OUTPATIENT)
Age: 60
End: 2025-10-16
Payer: COMMERCIAL

## 2025-11-12 ENCOUNTER — APPOINTMENT (OUTPATIENT)
Dept: ORTHOPEDIC SURGERY | Facility: CLINIC | Age: 60
End: 2025-11-12
Payer: COMMERCIAL

## 2026-04-15 ENCOUNTER — APPOINTMENT (OUTPATIENT)
Dept: PRIMARY CARE | Facility: CLINIC | Age: 61
End: 2026-04-15
Payer: COMMERCIAL

## 2026-06-25 ENCOUNTER — APPOINTMENT (OUTPATIENT)
Dept: GYNECOLOGIC ONCOLOGY | Facility: CLINIC | Age: 61
End: 2026-06-25
Payer: COMMERCIAL

## (undated) DEVICE — NEEDLE, SPINAL, QUINCKE, 18 G X 3.5 IN, PINK HUB

## (undated) DEVICE — GLOVE, SURGICAL, PROTEXIS PI ORTHO, 8.0, PF, LF

## (undated) DEVICE — Device

## (undated) DEVICE — CATHETER TRAY, SURESTEP, 14FR, PRECONNECTED DRAIN BAG, PVC

## (undated) DEVICE — HOOD, STERISHIELD T4 SYSTEM

## (undated) DEVICE — BANDAGE, ELASTIC,  6 IN X 11 YDS, STERILE, LF

## (undated) DEVICE — BLADE, SAW, RECIPROCATING, DOUBLE SIDED, THIN, STAINLESS STEEL

## (undated) DEVICE — SLEEVE, VASO PRESS, CALF GARMENT, MEDIUM, GREEN

## (undated) DEVICE — TUBING, IRRIGATION, HIGH FLOW, HAND PIECE SET

## (undated) DEVICE — BLADE, SAW, SAGITTAL, 18.0 X 1.27 X 90MM

## (undated) DEVICE — GLOVE, SURGICAL, PROTEXIS PI BLUE W/NEUTHERA, 8.0, PF, LF

## (undated) DEVICE — IRRIGATION SYSTEM, WOUND, SURGIPHOR, 450ML, STERILE

## (undated) DEVICE — SUTURE, CTD, VICRYL, 2-0, UND, BR, CT-2

## (undated) DEVICE — SOLUTION, IRRIGATION, USP, SODIUM CHLORIDE 0.9%, 3000 ML, BAG

## (undated) DEVICE — APPLICATOR, CHLORAPREP, W/ORANGE TINT, 26ML

## (undated) DEVICE — SYRINGE, 20 CC, LUER LOCK

## (undated) DEVICE — SUTURE, V-LOC, 3-0, 18IN, P-12

## (undated) DEVICE — SUTURE, VICRYL, 1, 24 IN, CTD, UNDYED